# Patient Record
Sex: MALE | Race: WHITE | NOT HISPANIC OR LATINO | URBAN - METROPOLITAN AREA
[De-identification: names, ages, dates, MRNs, and addresses within clinical notes are randomized per-mention and may not be internally consistent; named-entity substitution may affect disease eponyms.]

---

## 2018-04-12 ENCOUNTER — INPATIENT (INPATIENT)
Facility: HOSPITAL | Age: 68
LOS: 2 days | Discharge: HOME | End: 2018-04-15
Attending: FAMILY MEDICINE | Admitting: FAMILY MEDICINE
Payer: MEDICARE

## 2018-04-12 VITALS
DIASTOLIC BLOOD PRESSURE: 90 MMHG | SYSTOLIC BLOOD PRESSURE: 151 MMHG | RESPIRATION RATE: 16 BRPM | HEART RATE: 95 BPM | TEMPERATURE: 99 F | OXYGEN SATURATION: 94 %

## 2018-04-12 LAB
ALBUMIN SERPL ELPH-MCNC: 4 G/DL — SIGNIFICANT CHANGE UP (ref 3.3–5)
ALP SERPL-CCNC: 90 U/L — SIGNIFICANT CHANGE UP (ref 40–120)
ALT FLD-CCNC: 22 U/L — SIGNIFICANT CHANGE UP (ref 12–78)
ANION GAP SERPL CALC-SCNC: 11 MMOL/L — SIGNIFICANT CHANGE UP (ref 5–17)
ANION GAP SERPL CALC-SCNC: 19 MMOL/L — HIGH (ref 5–17)
APTT BLD: 26.6 SEC — LOW (ref 27.5–37.4)
AST SERPL-CCNC: 24 U/L — SIGNIFICANT CHANGE UP (ref 15–37)
BASOPHILS # BLD AUTO: 0.06 K/UL — SIGNIFICANT CHANGE UP (ref 0–0.2)
BASOPHILS NFR BLD AUTO: 0.5 % — SIGNIFICANT CHANGE UP (ref 0–2)
BILIRUB SERPL-MCNC: 0.5 MG/DL — SIGNIFICANT CHANGE UP (ref 0.2–1.2)
BUN SERPL-MCNC: 11 MG/DL — SIGNIFICANT CHANGE UP (ref 7–23)
BUN SERPL-MCNC: 8 MG/DL — SIGNIFICANT CHANGE UP (ref 7–23)
CALCIUM SERPL-MCNC: 9.3 MG/DL — SIGNIFICANT CHANGE UP (ref 8.5–10.1)
CALCIUM SERPL-MCNC: 9.5 MG/DL — SIGNIFICANT CHANGE UP (ref 8.5–10.1)
CHLORIDE SERPL-SCNC: 92 MMOL/L — LOW (ref 96–108)
CHLORIDE SERPL-SCNC: 95 MMOL/L — LOW (ref 96–108)
CO2 SERPL-SCNC: 18 MMOL/L — LOW (ref 22–31)
CO2 SERPL-SCNC: 25 MMOL/L — SIGNIFICANT CHANGE UP (ref 22–31)
CREAT SERPL-MCNC: 0.78 MG/DL — SIGNIFICANT CHANGE UP (ref 0.5–1.3)
CREAT SERPL-MCNC: 1.02 MG/DL — SIGNIFICANT CHANGE UP (ref 0.5–1.3)
EOSINOPHIL # BLD AUTO: 0.09 K/UL — SIGNIFICANT CHANGE UP (ref 0–0.5)
EOSINOPHIL NFR BLD AUTO: 0.8 % — SIGNIFICANT CHANGE UP (ref 0–6)
ETHANOL SERPL-MCNC: <10 MG/DL — SIGNIFICANT CHANGE UP (ref 0–10)
GLUCOSE SERPL-MCNC: 101 MG/DL — HIGH (ref 70–99)
GLUCOSE SERPL-MCNC: 127 MG/DL — HIGH (ref 70–99)
HCT VFR BLD CALC: 40.8 % — SIGNIFICANT CHANGE UP (ref 39–50)
HGB BLD-MCNC: 14.5 G/DL — SIGNIFICANT CHANGE UP (ref 13–17)
IMM GRANULOCYTES NFR BLD AUTO: 0.4 % — SIGNIFICANT CHANGE UP (ref 0–1.5)
INR BLD: 1.31 RATIO — HIGH (ref 0.88–1.16)
LYMPHOCYTES # BLD AUTO: 28.8 % — SIGNIFICANT CHANGE UP (ref 13–44)
LYMPHOCYTES # BLD AUTO: 3.23 K/UL — SIGNIFICANT CHANGE UP (ref 1–3.3)
MAGNESIUM SERPL-MCNC: 1.6 MG/DL — SIGNIFICANT CHANGE UP (ref 1.6–2.6)
MCHC RBC-ENTMCNC: 30.3 PG — SIGNIFICANT CHANGE UP (ref 27–34)
MCHC RBC-ENTMCNC: 35.5 GM/DL — SIGNIFICANT CHANGE UP (ref 32–36)
MCV RBC AUTO: 85.4 FL — SIGNIFICANT CHANGE UP (ref 80–100)
MONOCYTES # BLD AUTO: 0.74 K/UL — SIGNIFICANT CHANGE UP (ref 0–0.9)
MONOCYTES NFR BLD AUTO: 6.6 % — SIGNIFICANT CHANGE UP (ref 2–14)
NEUTROPHILS # BLD AUTO: 7.03 K/UL — SIGNIFICANT CHANGE UP (ref 1.8–7.4)
NEUTROPHILS NFR BLD AUTO: 62.9 % — SIGNIFICANT CHANGE UP (ref 43–77)
NRBC # BLD: 0 /100 WBCS — SIGNIFICANT CHANGE UP (ref 0–0)
PLATELET # BLD AUTO: 245 K/UL — SIGNIFICANT CHANGE UP (ref 150–400)
POTASSIUM SERPL-MCNC: 3.3 MMOL/L — LOW (ref 3.5–5.3)
POTASSIUM SERPL-MCNC: 3.4 MMOL/L — LOW (ref 3.5–5.3)
POTASSIUM SERPL-SCNC: 3.3 MMOL/L — LOW (ref 3.5–5.3)
POTASSIUM SERPL-SCNC: 3.4 MMOL/L — LOW (ref 3.5–5.3)
PROT SERPL-MCNC: 7.9 GM/DL — SIGNIFICANT CHANGE UP (ref 6–8.3)
PROTHROM AB SERPL-ACNC: 14.2 SEC — HIGH (ref 9.8–12.7)
RBC # BLD: 4.78 M/UL — SIGNIFICANT CHANGE UP (ref 4.2–5.8)
RBC # FLD: 13.6 % — SIGNIFICANT CHANGE UP (ref 10.3–14.5)
SODIUM SERPL-SCNC: 129 MMOL/L — LOW (ref 135–145)
SODIUM SERPL-SCNC: 131 MMOL/L — LOW (ref 135–145)
TROPONIN I SERPL-MCNC: <0.015 NG/ML — SIGNIFICANT CHANGE UP (ref 0.01–0.04)
WBC # BLD: 11.2 K/UL — HIGH (ref 3.8–10.5)
WBC # FLD AUTO: 11.2 K/UL — HIGH (ref 3.8–10.5)

## 2018-04-12 PROCEDURE — 70450 CT HEAD/BRAIN W/O DYE: CPT | Mod: 26,59

## 2018-04-12 PROCEDURE — 70496 CT ANGIOGRAPHY HEAD: CPT | Mod: 26

## 2018-04-12 PROCEDURE — 99285 EMERGENCY DEPT VISIT HI MDM: CPT

## 2018-04-12 PROCEDURE — 93010 ELECTROCARDIOGRAM REPORT: CPT

## 2018-04-12 RX ORDER — LEVETIRACETAM 250 MG/1
1000 TABLET, FILM COATED ORAL EVERY 12 HOURS
Qty: 0 | Refills: 0 | Status: DISCONTINUED | OUTPATIENT
Start: 2018-04-13 | End: 2018-04-14

## 2018-04-12 RX ORDER — ONDANSETRON 8 MG/1
4 TABLET, FILM COATED ORAL ONCE
Qty: 0 | Refills: 0 | Status: COMPLETED | OUTPATIENT
Start: 2018-04-12 | End: 2018-04-12

## 2018-04-12 RX ORDER — AMLODIPINE BESYLATE 2.5 MG/1
10 TABLET ORAL DAILY
Qty: 0 | Refills: 0 | Status: DISCONTINUED | OUTPATIENT
Start: 2018-04-12 | End: 2018-04-12

## 2018-04-12 RX ORDER — HYDRALAZINE HCL 50 MG
50 TABLET ORAL
Qty: 0 | Refills: 0 | Status: DISCONTINUED | OUTPATIENT
Start: 2018-04-12 | End: 2018-04-12

## 2018-04-12 RX ORDER — METOPROLOL TARTRATE 50 MG
100 TABLET ORAL DAILY
Qty: 0 | Refills: 0 | Status: DISCONTINUED | OUTPATIENT
Start: 2018-04-12 | End: 2018-04-15

## 2018-04-12 RX ORDER — LEVETIRACETAM 250 MG/1
1000 TABLET, FILM COATED ORAL ONCE
Qty: 0 | Refills: 0 | Status: COMPLETED | OUTPATIENT
Start: 2018-04-12 | End: 2018-04-12

## 2018-04-12 RX ORDER — APIXABAN 2.5 MG/1
5 TABLET, FILM COATED ORAL EVERY 12 HOURS
Qty: 0 | Refills: 0 | Status: DISCONTINUED | OUTPATIENT
Start: 2018-04-12 | End: 2018-04-15

## 2018-04-12 RX ORDER — ACETAMINOPHEN 500 MG
650 TABLET ORAL EVERY 6 HOURS
Qty: 0 | Refills: 0 | Status: DISCONTINUED | OUTPATIENT
Start: 2018-04-12 | End: 2018-04-15

## 2018-04-12 RX ORDER — SODIUM CHLORIDE 9 MG/ML
1000 INJECTION INTRAMUSCULAR; INTRAVENOUS; SUBCUTANEOUS
Qty: 0 | Refills: 0 | Status: DISCONTINUED | OUTPATIENT
Start: 2018-04-12 | End: 2018-04-15

## 2018-04-12 RX ORDER — ONDANSETRON 8 MG/1
4 TABLET, FILM COATED ORAL EVERY 6 HOURS
Qty: 0 | Refills: 0 | Status: DISCONTINUED | OUTPATIENT
Start: 2018-04-12 | End: 2018-04-15

## 2018-04-12 RX ADMIN — LEVETIRACETAM 400 MILLIGRAM(S): 250 TABLET, FILM COATED ORAL at 17:25

## 2018-04-12 RX ADMIN — SODIUM CHLORIDE 100 MILLILITER(S): 9 INJECTION INTRAMUSCULAR; INTRAVENOUS; SUBCUTANEOUS at 22:46

## 2018-04-12 RX ADMIN — Medication 2 MILLIGRAM(S): at 17:35

## 2018-04-12 RX ADMIN — ONDANSETRON 4 MILLIGRAM(S): 8 TABLET, FILM COATED ORAL at 17:26

## 2018-04-12 RX ADMIN — Medication 2 MILLIGRAM(S): at 17:21

## 2018-04-12 NOTE — ED PROVIDER NOTE - OBJECTIVE STATEMENT
66 y/o male with no pertinent past medical history, [facility] local [facility], pt is a "regular" at the [facility] and the workers noticed the patient was acting strangely in his car so they called EMS. EMS states the patient was normal at the [facility] and had an acute onset of confusion at 4:30pm. 68 y/o male with no known past medical history, [facility] local [facility], pt is a "regular" at the [facility] and the workers noticed the patient was acting strangely in his car so they called EMS. EMS states the patient was normal at the [facility] and had an acute onset of confusion at 4:30pm. 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 EMS states the patient was normal at the [facility] and had an acute onset of confusion at 4:30pm.

## 2018-04-12 NOTE — ED ADULT NURSE REASSESSMENT NOTE - NS ED NURSE REASSESS COMMENT FT1
1721 - Pt going to CT scan.   1721 - IV access established RFA (ELIZABETH - RN)   While in the tube for CT scan pt began having a tonic clonic seizure that lasted approximately 1 minute long, pt bit tip of his tongue. pt became inc of urine, pt becoming post elicital and unable to follow commands, safety maintained, non rebreather applied and pt satting 100%, airway protected. Pt sustained abrasion on L wrist.   1721 - 2mg Ativan administered (RAZ - RN)  1725 - Infusion of 100mg Keppra IVPB (RAZ-RN)  1726 - IV access established in LFA (DARCI - RN)  1726 - 4mg of zofran administered (RAZ - RN)    1735 - 2mg ativan administered (RAZ - RN) 1721 - Pt going to CT scan.   1721 - IV access established RFA (ELIZABETH - RN)   While in the tube for CT scan pt began having a tonic clonic seizure that lasted approximately 1 minute long, pt bit tip of his tongue. pt became inc of urine, pt becoming post elicital and unable to follow commands, safety maintained, non rebreather applied and pt satting 100%, airway protected. Pt sustained abrasion on L wrist.  1721 - 2mg Ativan administered (BH - RN)  1725 - Infusion of 100mg Keppra IVPB (BH-RN)  1726 - IV access established in LFA (BE - RN)  1726 - 4mg of zofran administered (BH - RN)    1735 - 2mg ativan administered (BH - RN)  Due to post elictal events pt is unable to tolerate the CTA at this time.

## 2018-04-12 NOTE — ED PROVIDER NOTE - MUSCULOSKELETAL, MLM
Spine appears normal, range of motion is not limited, no muscle or joint tenderness. Moving all four extremities. No facial asymmetry.

## 2018-04-12 NOTE — ED PROVIDER NOTE - PROGRESS NOTE DETAILS
Leandro AI- Pt had seizure episode in CT scan to ativan given, will assess for CVA with CT angio. Leandro MCCORD- Discussed case with transfer center who states the patient is not eligible for transfer. Leandro MCCORD- Spoke to patient's wife at bedside who states patient has a history of seizure after an intercerebral stroke, not on any seizure medications. Leandro MCCORD- Spoke to Neurologist Dr. Keating who will evaluate the patient as an inpatient, recommends MR brain.

## 2018-04-12 NOTE — ED PROVIDER NOTE - CONSTITUTIONAL, MLM
normal... Well appearing, well nourished, awake, alert, oriented to person, place, time/situation and in no apparent distress. Well nourished, awake, alert, oriented to person, place, time/situation.

## 2018-04-12 NOTE — H&P ADULT - NSHPPHYSICALEXAM_GEN_ALL_CORE
Vital Signs Last 24 Hrs  T(C): 37.1 (12 Apr 2018 17:18), Max: 37.1 (12 Apr 2018 17:18)  T(F): 98.7 (12 Apr 2018 17:18), Max: 98.7 (12 Apr 2018 17:18)  HR: 102 (12 Apr 2018 19:00) (95 - 141)  BP: 110/61 (12 Apr 2018 19:00) (97/65 - 151/90)  BP(mean): --  RR: 14 (12 Apr 2018 19:00) (14 - 23)  SpO2: 97% (12 Apr 2018 19:00) (94% - 100%)

## 2018-04-12 NOTE — ED ADULT NURSE NOTE - OBJECTIVE STATEMENT
Pt found in parking lot acting abnormally, biba uknown hx, code stroke initiated, pt brought to ct scan. No active distress noted. Pt nodding head to all questions, not speaking.

## 2018-04-12 NOTE — ED ADULT TRIAGE NOTE - CHIEF COMPLAINT QUOTE
Pt. to the ED [facility] from [facility] C/O AMS since 4: 30 PM- As per EMS, Pt is a regular client and today he got confused after eating Pizza. Code Stroke called to the ED by EMS RN - Dr. Keating to the bedside

## 2018-04-12 NOTE — H&P ADULT - HISTORY OF PRESENT ILLNESS
66 yo male with PMH of a. 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 Pt is a poor historian and does not recall events of the day. As per EMS documentation [facility] and was noted to be confused and not acting like himself. The workers called 911 when the patient was in his car as he was with AMS. He was last known to be his normal self at 430PM. In the ED pt was a CODE STROKE and was going for a CT of the head when he had a tonic clonic seizure which lasted about 1 minute as witnessed by RNs and staff in the CT machine. Pt was incontinent of urine and also bit his tongue. He has an abrasion on his left wrist. Upon arrival to ED and currently pt remains with global aphasia. He is still confused and A+Ox1 (only to self). He denies any fevers, chills, headaches, changes in vision, weakness, chest pain, palpitations, abd pain, N/V, dizziness. He was not noted to have a facial droop or slurred speech. As per wife pt did have a seizure x1 in 2015 post hemorrhagic CVA and no further episodes.     Pt not a candidate for TPA as he is on eliquis and he was declined by transfer center as per ED.

## 2018-04-12 NOTE — H&P ADULT - ASSESSMENT
66 yo male with PMH of a. 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 Pt is a poor historian and does not recall events of the day. As per EMS [facility] and was noted to be confused and not acting like himself. The workers called 911 when the patient was in his car as he was with AMS. He was last known to be his normal self at 430PM. In the ED pt was a CODE STROKE and was going for a CT of the head when he had a tonic clonic seizure which lasted about 1 minute as witnessed by RNs and staff in the CT machine. Pt was incontinent of urine and also bit his tongue. He has an abrasion on his left wrist. Upon arrival to ED and currently pt remains with global aphasia. He is still confused and A+Ox1 (only to self). He denies any fevers, chills, headaches, changes in vision, weakness, chest pain, palpitations, abd pain, N/V, dizziness. He was not noted to have a facial droop or slurred speech. As per wife pt did have a seizure x1 in 2015 post hemorrhagic CVA and no further episodes.     #AMS with global aphasia - r/o CVA  #tonic clonic seizure  - admit to tele  - CT head and CTA negative for ACUTE infarct, old infarcts noted, no hemodynamically significant stenosis  - MRI head  - EEG  - keppra IV BID  - NPO  - neuro checks  - neuro consult  - echo  - seizure precautions  - speech and swallow eval    #Anion gap metabolic acidosis  - chronic as per wife at bedside, has previously been on sodium bicarb tabs at home  - blood drawn after seizure episode  - repeat BMP now  - IV fluids  - hold HCTZ    #Hyponatremia  - hold HCTZ  - IV fluids  - monitor Na levels    #A. fib with mild RVR  - continue metoprolol  - continue eliquis    #HTN  - continue metoprolol  - hold hydralazine and norvasc tonight given possibility of acute CVA    #DVT prophylaxis  - continue eliquis

## 2018-04-12 NOTE — ED ADULT NURSE REASSESSMENT NOTE - NS ED NURSE REASSESS COMMENT FT1
Pt returned to trauma room and monitored, vitals stable see flow sheet   1830 - Pt now redirectable, able to tolerate the CTA

## 2018-04-12 NOTE — PATIENT PROFILE ADULT. - TEACHING/LEARNING LEARNING PREFERENCES
verbal instruction/video/written material/audio/individual instruction/pictorial/skill demonstration/computer/internet/group instruction

## 2018-04-13 LAB
ALBUMIN SERPL ELPH-MCNC: 3.3 G/DL — SIGNIFICANT CHANGE UP (ref 3.3–5)
ALP SERPL-CCNC: 80 U/L — SIGNIFICANT CHANGE UP (ref 40–120)
ALT FLD-CCNC: 17 U/L — SIGNIFICANT CHANGE UP (ref 12–78)
AMPHET UR-MCNC: NEGATIVE — SIGNIFICANT CHANGE UP
ANION GAP SERPL CALC-SCNC: 11 MMOL/L — SIGNIFICANT CHANGE UP (ref 5–17)
AST SERPL-CCNC: 19 U/L — SIGNIFICANT CHANGE UP (ref 15–37)
BARBITURATES UR SCN-MCNC: NEGATIVE — SIGNIFICANT CHANGE UP
BASOPHILS # BLD AUTO: 0.03 K/UL — SIGNIFICANT CHANGE UP (ref 0–0.2)
BASOPHILS NFR BLD AUTO: 0.4 % — SIGNIFICANT CHANGE UP (ref 0–2)
BENZODIAZ UR-MCNC: NEGATIVE — SIGNIFICANT CHANGE UP
BILIRUB SERPL-MCNC: 0.5 MG/DL — SIGNIFICANT CHANGE UP (ref 0.2–1.2)
BUN SERPL-MCNC: 6 MG/DL — LOW (ref 7–23)
CALCIUM SERPL-MCNC: 8.9 MG/DL — SIGNIFICANT CHANGE UP (ref 8.5–10.1)
CHLORIDE SERPL-SCNC: 99 MMOL/L — SIGNIFICANT CHANGE UP (ref 96–108)
CHOLEST SERPL-MCNC: 158 MG/DL — SIGNIFICANT CHANGE UP (ref 10–199)
CO2 SERPL-SCNC: 24 MMOL/L — SIGNIFICANT CHANGE UP (ref 22–31)
COCAINE METAB.OTHER UR-MCNC: NEGATIVE — SIGNIFICANT CHANGE UP
CREAT SERPL-MCNC: 0.74 MG/DL — SIGNIFICANT CHANGE UP (ref 0.5–1.3)
EOSINOPHIL # BLD AUTO: 0.05 K/UL — SIGNIFICANT CHANGE UP (ref 0–0.5)
EOSINOPHIL NFR BLD AUTO: 0.7 % — SIGNIFICANT CHANGE UP (ref 0–6)
GLUCOSE SERPL-MCNC: 94 MG/DL — SIGNIFICANT CHANGE UP (ref 70–99)
HBA1C BLD-MCNC: 5.4 % — SIGNIFICANT CHANGE UP (ref 4–5.6)
HCT VFR BLD CALC: 38.5 % — LOW (ref 39–50)
HDLC SERPL-MCNC: 51 MG/DL — SIGNIFICANT CHANGE UP (ref 40–125)
HGB BLD-MCNC: 13.8 G/DL — SIGNIFICANT CHANGE UP (ref 13–17)
IMM GRANULOCYTES NFR BLD AUTO: 0.3 % — SIGNIFICANT CHANGE UP (ref 0–1.5)
LIPID PNL WITH DIRECT LDL SERPL: 91 MG/DL — SIGNIFICANT CHANGE UP
LYMPHOCYTES # BLD AUTO: 2.06 K/UL — SIGNIFICANT CHANGE UP (ref 1–3.3)
LYMPHOCYTES # BLD AUTO: 29.2 % — SIGNIFICANT CHANGE UP (ref 13–44)
MAGNESIUM SERPL-MCNC: 1.7 MG/DL — SIGNIFICANT CHANGE UP (ref 1.6–2.6)
MCHC RBC-ENTMCNC: 30.2 PG — SIGNIFICANT CHANGE UP (ref 27–34)
MCHC RBC-ENTMCNC: 35.8 GM/DL — SIGNIFICANT CHANGE UP (ref 32–36)
MCV RBC AUTO: 84.2 FL — SIGNIFICANT CHANGE UP (ref 80–100)
METHADONE UR-MCNC: NEGATIVE — SIGNIFICANT CHANGE UP
MONOCYTES # BLD AUTO: 0.76 K/UL — SIGNIFICANT CHANGE UP (ref 0–0.9)
MONOCYTES NFR BLD AUTO: 10.8 % — SIGNIFICANT CHANGE UP (ref 2–14)
NEUTROPHILS # BLD AUTO: 4.14 K/UL — SIGNIFICANT CHANGE UP (ref 1.8–7.4)
NEUTROPHILS NFR BLD AUTO: 58.6 % — SIGNIFICANT CHANGE UP (ref 43–77)
NRBC # BLD: 0 /100 WBCS — SIGNIFICANT CHANGE UP (ref 0–0)
OPIATES UR-MCNC: NEGATIVE — SIGNIFICANT CHANGE UP
PCP SPEC-MCNC: SIGNIFICANT CHANGE UP
PCP UR-MCNC: NEGATIVE — SIGNIFICANT CHANGE UP
PHOSPHATE SERPL-MCNC: 2.4 MG/DL — LOW (ref 2.5–4.5)
PLATELET # BLD AUTO: 200 K/UL — SIGNIFICANT CHANGE UP (ref 150–400)
POTASSIUM SERPL-MCNC: 3.2 MMOL/L — LOW (ref 3.5–5.3)
POTASSIUM SERPL-SCNC: 3.2 MMOL/L — LOW (ref 3.5–5.3)
PROT SERPL-MCNC: 6.8 GM/DL — SIGNIFICANT CHANGE UP (ref 6–8.3)
RBC # BLD: 4.57 M/UL — SIGNIFICANT CHANGE UP (ref 4.2–5.8)
RBC # FLD: 13.7 % — SIGNIFICANT CHANGE UP (ref 10.3–14.5)
SODIUM SERPL-SCNC: 134 MMOL/L — LOW (ref 135–145)
THC UR QL: NEGATIVE — SIGNIFICANT CHANGE UP
TOTAL CHOLESTEROL/HDL RATIO MEASUREMENT: 3.1 RATIO — LOW (ref 3.4–9.6)
TRIGL SERPL-MCNC: 82 MG/DL — SIGNIFICANT CHANGE UP (ref 10–149)
TSH SERPL-MCNC: 1.8 UIU/ML — SIGNIFICANT CHANGE UP (ref 0.36–3.74)
WBC # BLD: 7.06 K/UL — SIGNIFICANT CHANGE UP (ref 3.8–10.5)
WBC # FLD AUTO: 7.06 K/UL — SIGNIFICANT CHANGE UP (ref 3.8–10.5)

## 2018-04-13 PROCEDURE — 99223 1ST HOSP IP/OBS HIGH 75: CPT

## 2018-04-13 PROCEDURE — 93306 TTE W/DOPPLER COMPLETE: CPT | Mod: 26

## 2018-04-13 PROCEDURE — 95816 EEG AWAKE AND DROWSY: CPT | Mod: 26

## 2018-04-13 PROCEDURE — 70551 MRI BRAIN STEM W/O DYE: CPT | Mod: 26

## 2018-04-13 RX ORDER — POTASSIUM CHLORIDE 20 MEQ
40 PACKET (EA) ORAL
Qty: 0 | Refills: 0 | Status: COMPLETED | OUTPATIENT
Start: 2018-04-13 | End: 2018-04-13

## 2018-04-13 RX ORDER — POTASSIUM CHLORIDE 20 MEQ
40 PACKET (EA) ORAL EVERY 4 HOURS
Qty: 0 | Refills: 0 | Status: DISCONTINUED | OUTPATIENT
Start: 2018-04-13 | End: 2018-04-13

## 2018-04-13 RX ADMIN — APIXABAN 5 MILLIGRAM(S): 2.5 TABLET, FILM COATED ORAL at 05:57

## 2018-04-13 RX ADMIN — Medication 40 MILLIEQUIVALENT(S): at 13:14

## 2018-04-13 RX ADMIN — SODIUM CHLORIDE 100 MILLILITER(S): 9 INJECTION INTRAMUSCULAR; INTRAVENOUS; SUBCUTANEOUS at 15:40

## 2018-04-13 RX ADMIN — Medication 100 MILLIGRAM(S): at 05:57

## 2018-04-13 RX ADMIN — Medication 1 TABLET(S): at 13:14

## 2018-04-13 RX ADMIN — APIXABAN 5 MILLIGRAM(S): 2.5 TABLET, FILM COATED ORAL at 17:05

## 2018-04-13 RX ADMIN — LEVETIRACETAM 400 MILLIGRAM(S): 250 TABLET, FILM COATED ORAL at 17:05

## 2018-04-13 RX ADMIN — Medication 40 MILLIEQUIVALENT(S): at 10:59

## 2018-04-13 RX ADMIN — LEVETIRACETAM 400 MILLIGRAM(S): 250 TABLET, FILM COATED ORAL at 05:57

## 2018-04-13 RX ADMIN — SODIUM CHLORIDE 100 MILLILITER(S): 9 INJECTION INTRAMUSCULAR; INTRAVENOUS; SUBCUTANEOUS at 06:08

## 2018-04-13 NOTE — SWALLOW BEDSIDE ASSESSMENT ADULT - SLP PERTINENT HISTORY OF CURRENT PROBLEM
brought by police to ER after experiencing period of confusion and speech diffiuclty at [facility] where he wanted to  a pizza.

## 2018-04-13 NOTE — SPEECH LANGUAGE PATHOLOGY EVALUATION - COMMENTS
REPETITION:  Able to repeat words and sentences of increasing word and syllable length, grammatic and syntactic, phonetic complexity without error of auditory working memory: no evidence of agrammatism; apraxia of motor speech or of dysarthria.   LEXICAL RETRIEVAL: Only mild sporadic longer latencies for lexical retrieval on the pictured items of less frequency, which is within normal age-appropriate limits. Motor speech is within normal limits: no evidence of dysarthria or of motor planning deficits for speech.    Speech-language function is within normal limits.  pt is at baseline and Service will not follow.  Please re-consult prn. See Dysphagia evaluation Able to follow 1,2,3 step ditrections:  Comprehension of Complex Ideational Material is within normal limits:

## 2018-04-13 NOTE — PHYSICAL THERAPY INITIAL EVALUATION ADULT - MODALITIES TREATMENT COMMENTS
pt left in bed supine post Eval; bed alarm on; HM in place; callbell in reach; pt instructed not to get up alone; call nursing for assist; krish well; denied pain

## 2018-04-13 NOTE — SWALLOW BEDSIDE ASSESSMENT ADULT - SLP GENERAL OBSERVATIONS
On encounter, pt seated upright in bed; alert and verbally conversant: interactive eye gaze.  Able to explain and recount events leading hospitalization with grammar and syntax, and no overt deficits in lexical retrieval.   :

## 2018-04-13 NOTE — SPEECH LANGUAGE PATHOLOGY EVALUATION - SLP BEHAVIORAL OBSERVATIONS
within functional limits/Seated upright in bed, about to eat breakfast: alert and opriented to proceedings: able to explain and recount the events leading to his hospitalization. Prragmatics:  able to maintain an approriate conversation with topic maintenance and change and termination. No overt deficts in grammar or syntax; or lexical retrieval.  Appropriate eye gaze.

## 2018-04-13 NOTE — SWALLOW BEDSIDE ASSESSMENT ADULT - NS SPL SWALLOW CLINIC TRIAL FT
Rx: Maintain regular consistency diet with regular liquids:   meds as tolerated.  pt is at baseline and service will not follow

## 2018-04-13 NOTE — CONSULT NOTE ADULT - SUBJECTIVE AND OBJECTIVE BOX
Patient is a 67y old  Male who presents with a chief complaint of AMS, seizure      HPI:  Patient is 67-year-old with history of hypertension, chronic atrial fibrillation with history of CVA in June 2016 with almost complete recovery, high cholesterol, history of alcohol abuse in the past but he stopped consuming alcohol 2017, he was admitted after he had an episode of altered mental status. Patient states he went to a [facility] parlor and he became confused. In the emergency room patient was noted to be confused and he had an episode of seizure. Which lasted for 1 minute. Patient had become incontinent to urine and also had tongue bite. Patient now is alert and oriented. She denies any chest pain, shortness of breath, palpitations or headache. He is in atrial fibrillation and heart rate is controlled. He denies any headache or any blurred vision.     CT Angio Head w/ IV Cont (04.12.18     FINDINGS: Comparison is made to CT head of same day.    The carotid circulation is intact without hemodynamically significant   stenosis.   The vertebral arteries are patent.         Intracranial vertebral arteries are intact without evidence of dissection   or hemodynamically significant stenosis. The basilar artery and posterior   cerebral arteries and are normal without hemodynamically significant   stenosis. Bilateral superior cerebellar arteries are intact. The   intracranial internal carotid arteries, middle cerebral arteries, and   anterior cerebral arteries are intact without hemodynamically significant   stenosis.  There is no evidence of aneurysm or vascular malformation.    The brain demonstrates no acute cerebral cortical infarct is seen.  No  evidence of midline shift.  The ventricles, sulci and basal cisterns   appear unremarkable.      The paranasal sinuses are clear.        IMPRESSION:          1.   Right carotid system:  No hemodynamically significant stenosis.          2.   Left carotid system:  No hemodynamically significant stenosis.           3.   Intracranial circulation:  No hemodynamically significant   stenosis.        PAST MEDICAL & SURGICAL HISTORY:  CVA (cerebral vascular accident)  Atrial fibrillation  Hypertension      PREVIOUS DIAGNOSTIC TESTING:      ECHO  FINDINGS: April 2018 normal LVEF  60-65%.      MEDICATIONS  (STANDING):  apixaban 5 milliGRAM(s) Oral every 12 hours  levETIRAcetam  IVPB 1000 milliGRAM(s) IV Intermittent every 12 hours  metoprolol succinate  milliGRAM(s) Oral daily  multivitamin 1 Tablet(s) Oral daily  potassium chloride    Tablet ER 40 milliEquivalent(s) Oral every 2 hours  sodium chloride 0.9%. 1000 milliLiter(s) (100 mL/Hr) IV Continuous <Continuous>    MEDICATIONS  (PRN):  acetaminophen   Tablet. 650 milliGRAM(s) Oral every 6 hours PRN Mild Pain (1 - 3)  LORazepam   Injectable 2 milliGRAM(s) IV Push every 8 hours PRN Seizures  ondansetron Injectable 4 milliGRAM(s) IV Push every 6 hours PRN Nausea      FAMILY HISTORY: both periods disease in the 70s with complication of coronary artery disease.      SOCIAL HISTORY:  he denies any  recent history of smoking or any alcohol consumption.    REVIEW OF SYSTEM:  Pertinent items are noted in HPI.  Constitutional	Negative for chills, fevers, sweats.    Eyes: 	Negative for visual disturbance.  Ears, nose, mouth, throat, and face: Negative for epistaxis, nasal congestion, sore throat and tinnitus.  Neck:	Negative for enlargement, pain and difficulty in swallowing  Respiration : Negative for cough, dyspnea on exertion, pleuritic chest pain and wheezing  Cardiovascular: Negative for chest pain, dyspnea and palpitations    Gastrointestinal : Negative for abdominal pain, diarrhea, nausea and vomiting  Genitourinary: Negative for dysuria, frequency and urinary incontinence .  Skin: Negative for  rash, pruritus, swelling, dryness .  	  Hematologic/lymphatic: Negative for bleeding and easy bruising  Musculoskeletal: Negative for arthralgias, back pain and muscle weakness.  Neurological: Negative for dizziness, headaches,  and tremors . He had an episode of confusion & seizure.  Behavioral/Psych: Negative for mood change, depression.  Endocrine:	Negative for blurry vision, polydipsia and polyuria, diaphoresis.   Allergic/Immunologic:	Negative for anaphylaxis, angioedema and urticaria.      Vital Signs Last 24 Hrs  T(C): 36.8 (13 Apr 2018 04:49), Max: 37.1 (12 Apr 2018 17:18)  T(F): 98.3 (13 Apr 2018 04:49), Max: 98.7 (12 Apr 2018 17:18)  HR: 86 (13 Apr 2018 04:49) (82 - 141)  BP: 119/76 (13 Apr 2018 04:49) (97/65 - 151/90)  BP(mean): --  RR: 16 (13 Apr 2018 04:49) (14 - 23)  SpO2: 96% (13 Apr 2018 04:49) (94% - 100%)    I&O's Summary    PHYSICAL EXAM  General Appearance: cooperative, no acute distress,   HEENT: PERRL, conjunctiva clear, EOM's intact, non injected pharynx, no exudate .  Neck: Supple, , no adenopathy, thyroid: not enlarged, no carotid bruit or JVD  Back: Symmetric, no  tenderness,no soft tissue tenderness  Lungs: Clear to auscultation bilateral,no adventitious breath sounds, normal   expiratory phase  Heart: Irregular rate and rhythm, S1, S2 normal, no murmur, rub or gallop  Abdomen: Soft, non-tender, bowel sounds active , no hepatosplenomegaly  Extremities: no cyanosis or edema, no joint swelling  Skin: Skin color, texture normal, no rashes   Neurologic: Alert and oriented X3 , cranial nerves intact, sensory and motor normal,        INTERPRETATION OF TELEMETRY: A fib heart rate is controlled    ECG: A fib         LABS:                          13.8   7.06  )-----------( 200      ( 13 Apr 2018 05:28 )             38.5     04-13    134<L>  |  99  |  6<L>  ----------------------------<  94  3.2<L>   |  24  |  0.74    Ca    8.9      13 Apr 2018 05:28  Phos  2.4     04-13  Mg     1.7     04-13    TPro  6.8  /  Alb  3.3  /  TBili  0.5  /  DBili  x   /  AST  19  /  ALT  17  /  AlkPhos  80  04-13    CARDIAC MARKERS ( 12 Apr 2018 18:23 )  <0.015 ng/mL / x     / x     / x     / x          Lipid Panel  158  51  91  82      PT/INR - ( 12 Apr 2018 18:23 )   PT: 14.2 sec;   INR: 1.31 ratio         PTT - ( 12 Apr 2018 18:23 )  PTT:26.6 sec

## 2018-04-13 NOTE — SWALLOW BEDSIDE ASSESSMENT ADULT - COMMENTS
67 year old male with PMH of a. 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 Pt is a poor historian and does not recall events of the day. As per EMS [facility] and was noted to be confused and not acting like himself. The workers called 911 when the patient was in his car as he was with AMS. He was last known to be his normal self at 430PM. In the ED pt was a CODE STROKE and was going for a CT of the head when he had a tonic clonic seizure which lasted about 1 minute as witnessed by RNs and staff in the CT machine. Pt was incontinent of urine and also bit his tongue. He has an abrasion on his left wrist. Upon arrival to ED and currently pt remains with global aphasia. He is still confused and A+Ox1 (only to self). He denies any fevers, chills, headaches, changes in vision, weakness, chest pain, palpitations, abd pain, N/V, dizziness. He was not noted to have a facial droop or slurred speech. As per wife pt did have a seizure x1 in 2015 post hemorrhagic CVA and no further episodes.

## 2018-04-13 NOTE — CONSULT NOTE ADULT - ASSESSMENT
Altered mental status/seizure episode.  History of CVA in the past.  Chronic atrial fibrillation.  Hypertension.  Suggest  Observe on telemetry.  Continue with beta blockers and anticoagulation with Eliquis.  Neurological workup is in progress.  Gradual ambulation.  Discussed general condition patient and wife and hospitalist.

## 2018-04-14 LAB
ANION GAP SERPL CALC-SCNC: 10 MMOL/L — SIGNIFICANT CHANGE UP (ref 5–17)
BUN SERPL-MCNC: 5 MG/DL — LOW (ref 7–23)
CALCIUM SERPL-MCNC: 8.5 MG/DL — SIGNIFICANT CHANGE UP (ref 8.5–10.1)
CHLORIDE SERPL-SCNC: 104 MMOL/L — SIGNIFICANT CHANGE UP (ref 96–108)
CO2 SERPL-SCNC: 24 MMOL/L — SIGNIFICANT CHANGE UP (ref 22–31)
CREAT SERPL-MCNC: 0.59 MG/DL — SIGNIFICANT CHANGE UP (ref 0.5–1.3)
GLUCOSE SERPL-MCNC: 81 MG/DL — SIGNIFICANT CHANGE UP (ref 70–99)
HCT VFR BLD CALC: 38.5 % — LOW (ref 39–50)
HGB BLD-MCNC: 13.2 G/DL — SIGNIFICANT CHANGE UP (ref 13–17)
MCHC RBC-ENTMCNC: 29.9 PG — SIGNIFICANT CHANGE UP (ref 27–34)
MCHC RBC-ENTMCNC: 34.3 GM/DL — SIGNIFICANT CHANGE UP (ref 32–36)
MCV RBC AUTO: 87.3 FL — SIGNIFICANT CHANGE UP (ref 80–100)
NRBC # BLD: 0 /100 WBCS — SIGNIFICANT CHANGE UP (ref 0–0)
PLATELET # BLD AUTO: 165 K/UL — SIGNIFICANT CHANGE UP (ref 150–400)
POTASSIUM SERPL-MCNC: 3.7 MMOL/L — SIGNIFICANT CHANGE UP (ref 3.5–5.3)
POTASSIUM SERPL-SCNC: 3.7 MMOL/L — SIGNIFICANT CHANGE UP (ref 3.5–5.3)
RBC # BLD: 4.41 M/UL — SIGNIFICANT CHANGE UP (ref 4.2–5.8)
RBC # FLD: 13.8 % — SIGNIFICANT CHANGE UP (ref 10.3–14.5)
SODIUM SERPL-SCNC: 138 MMOL/L — SIGNIFICANT CHANGE UP (ref 135–145)
WBC # BLD: 5.58 K/UL — SIGNIFICANT CHANGE UP (ref 3.8–10.5)
WBC # FLD AUTO: 5.58 K/UL — SIGNIFICANT CHANGE UP (ref 3.8–10.5)

## 2018-04-14 PROCEDURE — 99223 1ST HOSP IP/OBS HIGH 75: CPT

## 2018-04-14 RX ORDER — AMLODIPINE BESYLATE 2.5 MG/1
10 TABLET ORAL ONCE
Qty: 0 | Refills: 0 | Status: COMPLETED | OUTPATIENT
Start: 2018-04-14 | End: 2018-04-14

## 2018-04-14 RX ORDER — LEVETIRACETAM 250 MG/1
1000 TABLET, FILM COATED ORAL
Qty: 0 | Refills: 0 | Status: DISCONTINUED | OUTPATIENT
Start: 2018-04-14 | End: 2018-04-15

## 2018-04-14 RX ADMIN — LEVETIRACETAM 1000 MILLIGRAM(S): 250 TABLET, FILM COATED ORAL at 17:29

## 2018-04-14 RX ADMIN — Medication 100 MILLIGRAM(S): at 05:28

## 2018-04-14 RX ADMIN — APIXABAN 5 MILLIGRAM(S): 2.5 TABLET, FILM COATED ORAL at 17:29

## 2018-04-14 RX ADMIN — AMLODIPINE BESYLATE 10 MILLIGRAM(S): 2.5 TABLET ORAL at 19:18

## 2018-04-14 RX ADMIN — Medication 1 TABLET(S): at 11:57

## 2018-04-14 RX ADMIN — LEVETIRACETAM 400 MILLIGRAM(S): 250 TABLET, FILM COATED ORAL at 05:28

## 2018-04-14 RX ADMIN — APIXABAN 5 MILLIGRAM(S): 2.5 TABLET, FILM COATED ORAL at 05:28

## 2018-04-14 NOTE — CONSULT NOTE ADULT - ASSESSMENT
Patient is 67-year-old with history of hypertension, chronic atrial fibrillation with history of CVA in June 2016 with almost complete recovery, high cholesterol, history of alcohol abuse in the past  he was admitted after he had an episode of altered mental status. Patient states he went to a [facility] parlor and he became confused.In the emergency room patient was noted to be confused and he had an episode of seizure, Which lasted for 1 minute.    New onset Seizures likely from Underlying old CVA  R/o Metabolic causes.  REc EEG monitoring for 24 hrs.  continue Keppra 1000 mg q 12 hrs.  Advise  not to drive.  Continue other meds.  Correct any underlying metabolic causes.  Will f/u.  Discussed with Pt, his wife  at bed side and .

## 2018-04-14 NOTE — CONSULT NOTE ADULT - SUBJECTIVE AND OBJECTIVE BOX
Patient is a 67y old  Male who presents with a chief complaint of AMS, seizures .       HPI:  68 yo male with PMH of a. 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 Pt is a poor historian and does not recall events of the day. As per EMS documentation [facility] and was noted to be confused and not acting like himself. The workers called 911 when the patient was in his car as he was with AMS. He was last known to be his normal self at 430PM. In the ED pt was a CODE STROKE and was going for a CT of the head when he had a tonic clonic seizure which lasted about 1 minute as witnessed by RNs and staff in the CT machine. Pt was incontinent of urine and also bit his tongue. He has an abrasion on his left wrist. Upon arrival to ED and currently pt remains with global aphasia. He is still confused and A+Ox1 (only to self). He denies any fevers, chills, headaches, changes in vision, weakness, chest pain, palpitations, abd pain, N/V, dizziness. He was not noted to have a facial droop or slurred speech. As per wife pt did have a seizure x1 in 2015 post hemorrhagic CVA and no further episodes. Today pt more awake, alert offers no c/o, wants to go home.     Pt not a candidate for TPA as he is on eliquis and he was declined by transfer center as per ED. Admitted and CT/ MRI done. Routine EEG were done.       PAST MEDICAL & SURGICAL HISTORY:  CVA (cerebral vascular accident)  Atrial fibrillation  Hypertension      FAMILY HISTORY:      Social Hx:  Nonsmoker, no drug or alcohol use    MEDICATIONS  (STANDING):  apixaban 5 milliGRAM(s) Oral every 12 hours  levETIRAcetam  IVPB 1000 milliGRAM(s) IV Intermittent every 12 hours  metoprolol succinate  milliGRAM(s) Oral daily  multivitamin 1 Tablet(s) Oral daily  sodium chloride 0.9%. 1000 milliLiter(s) (100 mL/Hr) IV Continuous <Continuous>       Allergies    No Known Allergies    ROS: Pertinent positives in HPI, all other ROS were reviewed and are negative.      Vital Signs Last 24 Hrs  T(C): 36.6 (14 Apr 2018 04:27), Max: 37.1 (13 Apr 2018 11:01)  T(F): 97.9 (14 Apr 2018 04:27), Max: 98.7 (13 Apr 2018 11:01)  HR: 90 (14 Apr 2018 04:27) (62 - 90)  BP: 133/85 (14 Apr 2018 04:27) (112/94 - 133/85)  BP(mean): --  RR: 18 (14 Apr 2018 04:27) (17 - 18)  SpO2: 97% (14 Apr 2018 04:27) (96% - 99%)        Constitutional: awake and alert.  HEENT: PERRLA, EOMI,   Neck: Supple.  Respiratory: Breath sounds are clear bilaterally  Cardiovascular: S1 and S2, irregular rhythm  Gastrointestinal: soft, nontender  Extremities:  no edema  Vascular:  Carotid Bruit - no  Musculoskeletal: no joint swelling/tenderness, no abnormal movements  Skin: No rashes    Neurological exam:  HF: A x O x 3. Appropriately interactive, normal affect. Speech fluent, No Aphasia or paraphasic errors. Naming /repetition intact   CN: JESSY, EOMI, VFF, facial sensation normal, no NLFD, tongue midline, Palate moves equally, SCM equal bilaterally  Motor: No pronator drift, Strength 5/5 in all 4 ext, normal bulk and tone, no tremor, rigidity or bradykinesia.    Sens: Intact to light touch / PP/ VS/ JS    Reflexes: Symmetric and normal . BJ 2+, BR 2+, KJ 1+, AJ 1+, downgoing toes b/l  Coord:  No FNFA, dysmetria, WALT intact   Gait/Balance: Cannot test    NIHSS: 0      Labs:   04-14    138  |  104  |  5<L>  ----------------------------<  81  3.7   |  24  |  0.59    Ca    8.5      14 Apr 2018 06:35  Phos  2.4     04-13  Mg     1.7     04-13    TPro  6.8  /  Alb  3.3  /  TBili  0.5  /  DBili  x   /  AST  19  /  ALT  17  /  AlkPhos  80  04-13    04-13 Chol 158 LDL 91 HDL 51 Trig 82  04-13 [id number] 5.4                          13.2   5.58  )-----------( 165      ( 14 Apr 2018 06:35 )             38.5       Radiology:  - CT Head with CTA brain 4/12/18  < from: CT Brain Stroke Protocol (04.12.18 @ 17:22) >  IMPRESSION:       No acute intracranial findings. Chronic appearing lacunar infarcts in the   bilateral external capsules and small old left posterior parietal lobe   infarct is again identified. Periventricular chronic microvascular   ischemic changes are present.     < from: CT Angio Head w/ IV Cont (04.12.18 @ 18:44) >    IMPRESSION:          1.   Right carotid system:  No hemodynamically significant stenosis.          2.   Left carotid system:  No hemodynamically significant stenosis.           3.   Intracranial circulation:  No hemodynamically significant   stenosis.        - MRI brain  4/13/18    IMPRESSION:  No acute infarct. Focal chronic infarct in the left   posterior temporal lobe and chronic lacunar infarcts in the bilateral   basal ganglia radiata are present.     - EEG 4/13/18  < from: EEG Awake or Drowsy (04.13.18 @ 09:00) >  Impression: Normal EEG performed with the patient awake and drowsy.

## 2018-04-15 VITALS
OXYGEN SATURATION: 98 % | DIASTOLIC BLOOD PRESSURE: 89 MMHG | RESPIRATION RATE: 20 BRPM | HEART RATE: 72 BPM | TEMPERATURE: 98 F | SYSTOLIC BLOOD PRESSURE: 125 MMHG

## 2018-04-15 LAB
ANION GAP SERPL CALC-SCNC: 10 MMOL/L — SIGNIFICANT CHANGE UP (ref 5–17)
BUN SERPL-MCNC: 8 MG/DL — SIGNIFICANT CHANGE UP (ref 7–23)
CALCIUM SERPL-MCNC: 8.7 MG/DL — SIGNIFICANT CHANGE UP (ref 8.5–10.1)
CHLORIDE SERPL-SCNC: 103 MMOL/L — SIGNIFICANT CHANGE UP (ref 96–108)
CO2 SERPL-SCNC: 23 MMOL/L — SIGNIFICANT CHANGE UP (ref 22–31)
CREAT SERPL-MCNC: 0.7 MG/DL — SIGNIFICANT CHANGE UP (ref 0.5–1.3)
GLUCOSE SERPL-MCNC: 88 MG/DL — SIGNIFICANT CHANGE UP (ref 70–99)
HCT VFR BLD CALC: 40.6 % — SIGNIFICANT CHANGE UP (ref 39–50)
HGB BLD-MCNC: 14.1 G/DL — SIGNIFICANT CHANGE UP (ref 13–17)
MCHC RBC-ENTMCNC: 30.1 PG — SIGNIFICANT CHANGE UP (ref 27–34)
MCHC RBC-ENTMCNC: 34.7 GM/DL — SIGNIFICANT CHANGE UP (ref 32–36)
MCV RBC AUTO: 86.6 FL — SIGNIFICANT CHANGE UP (ref 80–100)
NRBC # BLD: 0 /100 WBCS — SIGNIFICANT CHANGE UP (ref 0–0)
PLATELET # BLD AUTO: 185 K/UL — SIGNIFICANT CHANGE UP (ref 150–400)
POTASSIUM SERPL-MCNC: 3.6 MMOL/L — SIGNIFICANT CHANGE UP (ref 3.5–5.3)
POTASSIUM SERPL-SCNC: 3.6 MMOL/L — SIGNIFICANT CHANGE UP (ref 3.5–5.3)
RBC # BLD: 4.69 M/UL — SIGNIFICANT CHANGE UP (ref 4.2–5.8)
RBC # FLD: 13.7 % — SIGNIFICANT CHANGE UP (ref 10.3–14.5)
SODIUM SERPL-SCNC: 136 MMOL/L — SIGNIFICANT CHANGE UP (ref 135–145)
WBC # BLD: 7.28 K/UL — SIGNIFICANT CHANGE UP (ref 3.8–10.5)
WBC # FLD AUTO: 7.28 K/UL — SIGNIFICANT CHANGE UP (ref 3.8–10.5)

## 2018-04-15 PROCEDURE — 99233 SBSQ HOSP IP/OBS HIGH 50: CPT

## 2018-04-15 PROCEDURE — 95951: CPT | Mod: 26

## 2018-04-15 RX ORDER — LEVETIRACETAM 250 MG/1
1 TABLET, FILM COATED ORAL
Qty: 60 | Refills: 0
Start: 2018-04-15 | End: 2018-05-14

## 2018-04-15 RX ORDER — LEVETIRACETAM 250 MG/1
1 TABLET, FILM COATED ORAL
Qty: 60 | Refills: 0 | DISCHARGE
Start: 2018-04-15 | End: 2018-05-14

## 2018-04-15 RX ADMIN — Medication 100 MILLIGRAM(S): at 05:48

## 2018-04-15 RX ADMIN — Medication 1 TABLET(S): at 11:38

## 2018-04-15 RX ADMIN — LEVETIRACETAM 1000 MILLIGRAM(S): 250 TABLET, FILM COATED ORAL at 05:48

## 2018-04-15 RX ADMIN — APIXABAN 5 MILLIGRAM(S): 2.5 TABLET, FILM COATED ORAL at 05:48

## 2018-04-15 NOTE — DISCHARGE NOTE ADULT - CARE PROVIDERS DIRECT ADDRESSES
,DirectAddress_Unknown,[email or web address]

## 2018-04-15 NOTE — DISCHARGE NOTE ADULT - PATIENT PORTAL LINK FT
You can access the [facility] Patient Portal, offered by [facility], by registering with the following website: [email or web address] Hospital[facility]

## 2018-04-15 NOTE — DISCHARGE NOTE ADULT - HOSPITAL COURSE
68 yo male with PMH of a. 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 Pt is a poor historian and does not recall events of the day. [facility] EMS [facility] and was noted to be confused and not acting like himself. The workers called 911 when the patient was in his car as he was with AMS. He was last known to be his normal self at 430PM. In the ED pt was a CODE STROKE and was going for a CT of the head when he had a tonic clonic seizure which lasted about 1 minute as witnessed by RNs and staff in the CT machine. Pt was incontinent of urine and also bit his tongue. He has an abrasion on his left wrist. Upon arrival to ED and currently pt remains with global aphasia. He is still confused and A+Ox1 (only to self). He denies any fevers, chills, headaches, changes in vision, weakness, chest pain, palpitations, abd pain, N/V, dizziness. He was not noted to have a facial droop or slurred speech. As per wife pt did have a seizure x1 in 2015 post hemorrhagic CVA and no further episodes.       Vital Signs Last 24 Hrs  T(C): 36.8 (15 Apr 2018 05:11), Max: 36.8 (15 Apr 2018 05:11)  T(F): 98.2 (15 Apr 2018 05:11), Max: 98.2 (15 Apr 2018 05:11)  HR: 69 (15 Apr 2018 05:11) (62 - 70)  BP: 120/64 (15 Apr 2018 05:11) (120/64 - 147/81)  BP(mean): 78 (15 Apr 2018 05:11) (78 - 78)  RR: 18 (14 Apr 2018 17:22) (18 - 20)  SpO2: 98% (15 Apr 2018 05:11) (98% - 99%)    Physical Exam:  · Constitutional	detailed exam	  · Constitutional Details	well-developed; well-groomed; no distress; obese	  · Eyes	EOMI; PERRL; no drainage or redness	  · Neck	No bruits; no thyromegaly or nodules	  · Respiratory	Breath Sounds equal & clear to percussion & auscultation, no accessory muscle use	  · Cardiovascular	detailed exam	  · Cardiovascular Details	irregular rate and rhythm; tachycardia	  · Gastrointestinal	Soft, non-tender, no hepatosplenomegaly, normal bowel sounds	  · Extremities	detailed exam	        #Metabolic encephalopathy with global aphasia possibly post ictal - no CVA  #tonic clonic seizure  - CT head and CTA negative for ACUTE infarct, old infarcts noted, no hemodynamically significant stenosis  - MRI head: no acute CVA  -Follow 24 hour EEG: normal  - keppra bid  -No driving until cleared by neurologist: discussed with patient and wife at bed side.     # Hypokalemia-due to HCTZ  Replaced ans resolved    #Anion gap metabolic acidosis  -Resolved    #Hyponatremia possibly due to HCTZ: improving    #A. fib   - continue metoprolol  eliquis    #HTN  - continue metoprolol    Home today.  His PMD was notified.  Spent more than 30 minutes to prepare the discharge.

## 2018-04-15 NOTE — DISCHARGE NOTE ADULT - PLAN OF CARE
Prevent further attack Follow up with neurologist  No driving for one year and until cleared by neurologist.

## 2018-04-15 NOTE — DISCHARGE NOTE ADULT - CARE PROVIDER_API CALL
Vishal Ramos), Medicine  [address] [place] [address]  Phone: [phone]  Fax: [phone]    Leah Mckenna), Neurology  General  [address] [place] [address]  [address] [place] [address]  Phone: [phone]  Fax: [phone]

## 2018-04-15 NOTE — PROGRESS NOTE ADULT - SUBJECTIVE AND OBJECTIVE BOX
HPI:  Patient is 67-year-old with history of hypertension, chronic atrial fibrillation with history of CVA in June 2016 with almost complete recovery, high cholesterol, history of alcohol abuse in the past but he stopped consuming alcohol 2017, he was admitted after he had an episode of altered mental status. Patient states he went to a [facility] parlor and he became confused. In the emergency room patient was noted to be confused and he had an episode of seizure. Which lasted for 1 minute. Patient had become incontinent to urine and also had tongue bite. he has been alert and oriented , he denies any headache or any blurred vision. No further episode of seizure. He denies any chest pain or shortness of breath. He is undergoing 24-hour EEG. His wife is at bedside. He continues to be in atrial fibrillation heart rate is controlled.     CT Angio Head w/ IV Cont (04.12.18     FINDINGS: Comparison is made to CT head of same day.    The carotid circulation is intact without hemodynamically significant   stenosis.   The vertebral arteries are patent.         Intracranial vertebral arteries are intact without evidence of dissection   or hemodynamically significant stenosis. The basilar artery and posterior   cerebral arteries and are normal without hemodynamically significant   stenosis. Bilateral superior cerebellar arteries are intact. The   intracranial internal carotid arteries, middle cerebral arteries, and   anterior cerebral arteries are intact without hemodynamically significant   stenosis.  There is no evidence of aneurysm or vascular malformation.    The brain demonstrates no acute cerebral cortical infarct is seen.  No  evidence of midline shift.  The ventricles, sulci and basal cisterns   appear unremarkable.      The paranasal sinuses are clear.        IMPRESSION:          1.   Right carotid system:  No hemodynamically significant stenosis.          2.   Left carotid system:  No hemodynamically significant stenosis.           3.   Intracranial circulation:  No hemodynamically significant   stenosis.        PAST MEDICAL & SURGICAL HISTORY:  CVA (cerebral vascular accident)  Atrial fibrillation  Hypertension      PREVIOUS DIAGNOSTIC TESTING:      ECHO  FINDINGS: April 2018 normal LVEF  60-65%.    MEDICATIONS  (STANDING):  apixaban 5 milliGRAM(s) Oral every 12 hours  levETIRAcetam 1000 milliGRAM(s) Oral two times a day  metoprolol succinate  milliGRAM(s) Oral daily  multivitamin 1 Tablet(s) Oral daily  sodium chloride 0.9%. 1000 milliLiter(s) (100 mL/Hr) IV Continuous <Continuous>    MEDICATIONS  (PRN):  acetaminophen   Tablet. 650 milliGRAM(s) Oral every 6 hours PRN Mild Pain (1 - 3)  LORazepam   Injectable 2 milliGRAM(s) IV Push every 8 hours PRN Seizures  ondansetron Injectable 4 milliGRAM(s) IV Push every 6 hours PRN Nausea        REVIEW OF SYSTEM:  Pertinent items are noted in HPI.  Constitutional	Negative for chills, fevers, sweats.    Eyes: 	Negative for visual disturbance.  Ears, nose, mouth, throat, and face: Negative for epistaxis, nasal congestion, sore throat .  Neck:	Negative for enlargement, pain and difficulty in swallowing  Respiration : Negative for cough,  pleuritic chest pain and wheezing  Cardiovascular: Negative for chest pain,  and palpitations    Gastrointestinal : Negative for abdominal pain, diarrhea, nausea and vomiting  Genitourinary: Negative for dysuria, frequency and urinary incontinence .  Skin: Negative for  rash, pruritus, swelling, dryness .  	  Hematologic/lymphatic: Negative for bleeding and easy bruising  Musculoskeletal: Negative for arthralgias, back pain and muscle weakness.  Neurological: Negative for dizziness, headaches, seizures and tremors  Behavioral/Psych: Negative for mood change, depression.  Endocrine:	Negative for blurry vision, polydipsia and polyuria, diaphoresis.   Allergic/Immunologic:	Negative for anaphylaxis, angioedema and urticaria.      Vital Signs Last 24 Hrs  T(C): 36.8 (15 Apr 2018 05:11), Max: 36.8 (15 Apr 2018 05:11)  T(F): 98.2 (15 Apr 2018 05:11), Max: 98.2 (15 Apr 2018 05:11)  HR: 69 (15 Apr 2018 05:11) (62 - 69)  BP: 120/64 (15 Apr 2018 05:11) (120/64 - 147/81)  BP(mean): 78 (15 Apr 2018 05:11) (78 - 78)  RR: 18 (14 Apr 2018 17:22) (18 - 18)  SpO2: 98% (15 Apr 2018 05:11) (98% - 99%)    I&O's Summary    14 Apr 2018 07:01  -  15 Apr 2018 07:00  --------------------------------------------------------  IN: 0 mL / OUT: 1600 mL / NET: -1600 mL      PHYSICAL EXAM  General Appearance: cooperative, no acute distress,   HEENT: PERRL, conjunctiva clear, EOM's intact, non injected pharynx, no exudate, .  Neck: Supple, , no adenopathy, thyroid: not enlarged, no carotid bruit or JVD  Back: Symmetric, no  tenderness,no soft tissue tenderness  Lungs: Clear to auscultation bilateral,no adventitious breath sounds, normal   expiratory phase  Heart: Irregular rate and rhythm, S1, S2 normal, no murmur, rub or gallop  Abdomen: Soft, non-tender, bowel sounds active , no hepatosplenomegaly  Extremities: no cyanosis or edema, no joint swelling  Skin: Skin color, texture normal, no rashes   Neurologic: Alert and oriented X3 , cranial nerves intact, sensory and motor normal,        INTERPRETATION OF TELEMETRY: A Fib heart rate is controlled.        LABS:                          14.1   7.28  )-----------( 185      ( 15 Apr 2018 07:09 )             40.6     04-15    136  |  103  |  8   ----------------------------<  88  3.6   |  23  |  0.70    Ca    8.7      15 Apr 2018 07:09          Lipid Panel  158  51  91  82                RADIOLOGY & ADDITIONAL STUDIES:
· Reason for Referral/Consultation:	Seizures and AMS /Old CVA	      · Subjective and Objective: 	  Patient is a 67y old  Male who presents with a chief complaint of AMS, seizures .       HPI:  68 yo male with PMH of a. 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 Pt is a poor historian and does not recall events of the day. As per EMS documentation pt was at a local [employer] that he normally goes to and was noted to be confused and not acting like himself. The workers called 911 when the patient was in his car as he was with AMS. He was last known to be his normal self at 430PM. In the ED pt was a CODE STROKE and was going for a CT of the head when he had a tonic clonic seizure which lasted about 1 minute as witnessed by RNs and staff in the CT machine. Pt was incontinent of urine and also bit his tongue. He has an abrasion on his left wrist. Upon arrival to ED and currently pt remains with global aphasia. He is still confused and A+Ox1 (only to self). He denies any fevers, chills, headaches, changes in vision, weakness, chest pain, palpitations, abd pain, N/V, dizziness. He was not noted to have a facial droop or slurred speech. As per wife pt did have a seizure x1 in 2015 post hemorrhagic CVA and no further episodes. Today pt more awake, alert offers no c/o, wants to go home.   Pt not a candidate for TPA as he is on eliquis and he was declined by transfer center as per ED. Admitted and CT/ MRI done. Routine EEG were done.    4/15/18 : Pt feeling better. No new c/o. No recurrent seizures. Wants to go home. Completed 24 hr EEG just now. Tolerating Keppra, no side effects.     MEDICATIONS  (STANDING):  apixaban 5 milliGRAM(s) Oral every 12 hours  levETIRAcetam 1000 milliGRAM(s) Oral two times a day  metoprolol succinate  milliGRAM(s) Oral daily  multivitamin 1 Tablet(s) Oral daily  sodium chloride 0.9%. 1000 milliLiter(s) (100 mL/Hr) IV Continuous <Continuous>      Vital Signs Last 24 Hrs  T(C): 36.8 (15 Apr 2018 05:11), Max: 36.8 (15 Apr 2018 05:11)  T(F): 98.2 (15 Apr 2018 05:11), Max: 98.2 (15 Apr 2018 05:11)  HR: 69 (15 Apr 2018 05:11) (62 - 70)  BP: 120/64 (15 Apr 2018 05:11) (120/64 - 147/81)  BP(mean): 78 (15 Apr 2018 05:11) (78 - 78)  RR: 18 (14 Apr 2018 17:22) (18 - 20)  SpO2: 98% (15 Apr 2018 05:11) (98% - 99%)    Constitutional: awake and alert.  HEENT: PERRLA, EOMI,   Neck: Supple.  Respiratory: Breath sounds are clear bilaterally  Cardiovascular: S1 and S2, irregular rhythm  Gastrointestinal: soft, nontender  Extremities:  no edema  Vascular:  Carotid Bruit - no  Musculoskeletal: no joint swelling/tenderness, no abnormal movements  Skin: No rashes    Neurological exam:  HF: A x O x 3. Appropriately interactive, normal affect. Speech fluent, No Aphasia or paraphasic errors. Naming /repetition intact   CN: JESSY, EOMI, VFF, facial sensation normal, no NLFD, tongue midline, Palate moves equally, SCM equal bilaterally  Motor: No pronator drift, Strength 5/5 in all 4 ext, normal bulk and tone, no tremor, rigidity or bradykinesia.    Sens: Intact to light touch / PP/ VS/ JS    Reflexes: Symmetric and normal . BJ 2+, BR 2+, KJ 1+, AJ 1+, downgoing toes b/l  Coord:  No FNFA, dysmetria, WALT intact   Gait/Balance: Cannot test    NIHSS: 0                        14.1   7.28  )-----------( 185      ( 15 Apr 2018 07:09 )             40.6     04-15    136  |  103  |  8   ----------------------------<  88  3.6   |  23  |  0.70    Ca    8.7      15 Apr 2018 07:09      04-13 [id number] 5.4    04-13 Chol 158 LDL 91 HDL 51 Trig 82    Radiology report:  - CT Head with CTA brain 4/12/18  < from: CT Brain Stroke Protocol (04.12.18 @ 17:22) >  IMPRESSION:       No acute intracranial findings. Chronic appearing lacunar infarcts in the   bilateral external capsules and small old left posterior parietal lobe   infarct is again identified. Periventricular chronic microvascular   ischemic changes are present.     < from: CT Angio Head w/ IV Cont (04.12.18 @ 18:44) >    IMPRESSION:          1.   Right carotid system:  No hemodynamically significant stenosis.          2.   Left carotid system:  No hemodynamically significant stenosis.           3.   Intracranial circulation:  No hemodynamically significant   stenosis.        - MRI brain  4/13/18    IMPRESSION:  No acute infarct. Focal chronic infarct in the left   posterior temporal lobe and chronic lacunar infarcts in the bilateral   basal ganglia radiata are present.     - EEG 4/13/18  < from: EEG Awake or Drowsy (04.13.18 @ 09:00) >  Impression: Normal EEG performed with the patient awake and drowsy.    24 Hr EEG Monitor 4/15/ 18   < from: EEG Monitoring Each 24 hours (04.15.18 @ 09:00) >  Impression : This is a normal 24 hour ambulatory video EEG. Clinical   correlation recommended.
68 yo male with PMH of a. 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 Pt is a poor historian and does not recall events of the day. [facility] and was noted to be confused and not acting like himself. The workers called 911 when the patient was in his car as he was with AMS. He was last known to be his normal self at 430PM. In the ED pt was a CODE STROKE and was going for a CT of the head when he had a tonic clonic seizure which lasted about 1 minute as witnessed by RNs and staff in the CT machine. Pt was incontinent of urine and also bit his tongue. He has an abrasion on his left wrist. Upon arrival to ED and currently pt remains with global aphasia. He is still confused and A+Ox1 (only to self). He denies any fevers, chills, headaches, changes in vision, weakness, chest pain, palpitations, abd pain, N/V, dizziness. He was not noted to have a facial droop or slurred speech. As per wife pt did have a seizure x1 in 2015 post hemorrhagic CVA and no further episodes.         04/13/18: patient seen and examined. No more seizures last night. Discussed with patient regarding management and d/c plan.   04/14/18: Patient seen and examined. 24 hour EEG in progress. Discussed with patient in length regarding management and d/c plan including not drive for 1 year and also until cleared by neurologist. Discussed with Dr Mckenna.       Vital Signs Last 24 Hrs  T(C): 36.7 (14 Apr 2018 11:09), Max: 36.9 (13 Apr 2018 16:14)  T(F): 98.1 (14 Apr 2018 11:09), Max: 98.5 (13 Apr 2018 16:14)  HR: 70 (14 Apr 2018 11:09) (62 - 90)  BP: 120/84 (14 Apr 2018 11:09) (112/94 - 133/85)  BP(mean): --  RR: 20 (14 Apr 2018 11:09) (18 - 20)  SpO2: 98% (14 Apr 2018 11:09) (97% - 99%)        Physical Exam:  · Constitutional	detailed exam	  · Constitutional Details	well-developed; well-groomed; no distress; obese	  · Eyes	EOMI; PERRL; no drainage or redness	  · Neck	No bruits; no thyromegaly or nodules	  · Respiratory	Breath Sounds equal & clear to percussion & auscultation, no accessory muscle use	  · Cardiovascular	detailed exam	  · Cardiovascular Details	irregular rate and rhythm; tachycardia	  · Gastrointestinal	Soft, non-tender, no hepatosplenomegaly, normal bowel sounds	  · Extremities	detailed exam	  · Extremities Details	pedal edema	  · Pedal Edema Severity	1+	  · Vascular	Equal and normal pulses (carotid, femoral, dorsalis pedis)	  · Neurological	detailed exam	  · Mental Status	Alert and oriented x1 (only to name), pt with global aphasia	  · Cranial Nerve	2-12 grossly intact	  · Motor	strength 5/5 b/l upper and lower ext	              Labs:                                                13.2   5.58  )-----------( 165      ( 14 Apr 2018 06:35 )             38.5     14 Apr 2018 06:35    138    |  104    |  5      ----------------------------<  81     3.7     |  24     |  0.59     Ca    8.5        14 Apr 2018 06:35  Phos  2.4       13 Apr 2018 05:28  Mg     1.7       13 Apr 2018 05:28    TPro  6.8    /  Alb  3.3    /  TBili  0.5    /  DBili  x      /  AST  19     /  ALT  17     /  AlkPhos  80     13 Apr 2018 05:28    LIVER FUNCTIONS - ( 13 Apr 2018 05:28 )  Alb: 3.3 g/dL / Pro: 6.8 gm/dL / ALK PHOS: 80 U/L / ALT: 17 U/L / AST: 19 U/L / GGT: x           PT/INR - ( 12 Apr 2018 18:23 )   PT: 14.2 sec;   INR: 1.31 ratio         PTT - ( 12 Apr 2018 18:23 )  PTT:26.6 sec  CAPILLARY BLOOD GLUCOSE        CARDIAC MARKERS ( 12 Apr 2018 18:23 )  <0.015 ng/mL / x     / x     / x     / x                  MEDICATIONS:    apixaban 5 milliGRAM(s) Oral every 12 hours  levETIRAcetam  IVPB 1000 milliGRAM(s) IV Intermittent every 12 hours  metoprolol succinate  milliGRAM(s) Oral daily  multivitamin 1 Tablet(s) Oral daily  sodium chloride 0.9%. 1000 milliLiter(s) (100 mL/Hr) IV Continuous <Continuous>    MEDICATIONS  (PRN):  acetaminophen   Tablet. 650 milliGRAM(s) Oral every 6 hours PRN Mild Pain (1 - 3)  LORazepam   Injectable 2 milliGRAM(s) IV Push every 8 hours PRN Seizures  ondansetron Injectable 4 milliGRAM(s) IV Push every 6 hours PRN Nausea        Assessment and Plan:   Assessment:  · Assessment		  68 yo male with PMH of a. 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 Pt is a poor historian and does not recall events of the day. As per EMS [facility] and was noted to be confused and not acting like himself. The workers called 911 when the patient was in his car as he was with AMS. He was last known to be his normal self at 430PM. In the ED pt was a CODE STROKE and was going for a CT of the head when he had a tonic clonic seizure which lasted about 1 minute as witnessed by RNs and staff in the CT machine. Pt was incontinent of urine and also bit his tongue. He has an abrasion on his left wrist. Upon arrival to ED and currently pt remains with global aphasia. He is still confused and A+Ox1 (only to self). He denies any fevers, chills, headaches, changes in vision, weakness, chest pain, palpitations, abd pain, N/V, dizziness. He was not noted to have a facial droop or slurred speech. As per wife pt did have a seizure x1 in 2015 post hemorrhagic CVA and no further episodes.     #AMS with global aphasia possibly post ictal - no CVA  #tonic clonic seizure  - CT head and CTA negative for ACUTE infarct, old infarcts noted, no hemodynamically significant stenosis  - MRI head: no acute CVA  -Follow 24 hour EEG  - keppra IV BID, change to po  - neuro checks  - neuro follow up appreciated  - seizure precautions.    # Hypokalemia-due to HCTZ  Replaced ans resolved    #Anion gap metabolic acidosis  -Resolved    #Hyponatremia possibly due to HCTZ: improving  - hold HCTZ  - monitor Na levels    #A. fib   - continue metoprolol  - continue eliquis    #HTN  - continue metoprolol    #DVT prophylaxis  - continue eliquis    Possible d/c tomorrow.
68 yo male with PMH of a. 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 Pt is a poor historian and does not recall events of the day. As per EMS [facility] and was noted to be confused and not acting like himself. The workers called 911 when the patient was in his car as he was with AMS. He was last known to be his normal self at 430PM. In the ED pt was a CODE STROKE and was going for a CT of the head when he had a tonic clonic seizure which lasted about 1 minute as witnessed by RNs and staff in the CT machine. Pt was incontinent of urine and also bit his tongue. He has an abrasion on his left wrist. Upon arrival to ED and currently pt remains with global aphasia. He is still confused and A+Ox1 (only to self). He denies any fevers, chills, headaches, changes in vision, weakness, chest pain, palpitations, abd pain, N/V, dizziness. He was not noted to have a facial droop or slurred speech. As per wife pt did have a seizure x1 in 2015 post hemorrhagic CVA and no further episodes.         04/13/18: patient seen and examined. No more seizures last night. Discussed with patient regarding management and d/c plan.       Vital Signs Last 24 Hrs  T(C): 37.1 (13 Apr 2018 11:01), Max: 37.1 (12 Apr 2018 17:18)  T(F): 98.7 (13 Apr 2018 11:01), Max: 98.7 (12 Apr 2018 17:18)  HR: 70 (13 Apr 2018 11:01) (70 - 141)  BP: 127/86 (13 Apr 2018 11:01) (97/65 - 151/90)  BP(mean): --  RR: 17 (13 Apr 2018 11:01) (14 - 23)  SpO2: 96% (13 Apr 2018 11:01) (94% - 100%)        Physical Exam:  · Constitutional	detailed exam	  · Constitutional Details	well-developed; well-groomed; no distress; obese	  · Eyes	EOMI; PERRL; no drainage or redness	  · Neck	No bruits; no thyromegaly or nodules	  · Respiratory	Breath Sounds equal & clear to percussion & auscultation, no accessory muscle use	  · Cardiovascular	detailed exam	  · Cardiovascular Details	irregular rate and rhythm; tachycardia	  · Gastrointestinal	Soft, non-tender, no hepatosplenomegaly, normal bowel sounds	  · Extremities	detailed exam	  · Extremities Details	pedal edema	  · Pedal Edema Severity	1+	  · Vascular	Equal and normal pulses (carotid, femoral, dorsalis pedis)	  · Neurological	detailed exam	  · Mental Status	Alert and oriented x1 (only to name), pt with global aphasia	  · Cranial Nerve	2-12 grossly intact	  · Motor	strength 5/5 b/l upper and lower ext	              Labs:                         13.8   7.06  )-----------( 200      ( 13 Apr 2018 05:28 )             38.5     13 Apr 2018 05:28    134    |  99     |  6      ----------------------------<  94     3.2     |  24     |  0.74     Ca    8.9        13 Apr 2018 05:28  Phos  2.4       13 Apr 2018 05:28  Mg     1.7       13 Apr 2018 05:28    TPro  6.8    /  Alb  3.3    /  TBili  0.5    /  DBili  x      /  AST  19     /  ALT  17     /  AlkPhos  80     13 Apr 2018 05:28    LIVER FUNCTIONS - ( 13 Apr 2018 05:28 )  Alb: 3.3 g/dL / Pro: 6.8 gm/dL / ALK PHOS: 80 U/L / ALT: 17 U/L / AST: 19 U/L / GGT: x           PT/INR - ( 12 Apr 2018 18:23 )   PT: 14.2 sec;   INR: 1.31 ratio         PTT - ( 12 Apr 2018 18:23 )  PTT:26.6 sec  CAPILLARY BLOOD GLUCOSE      POCT Blood Glucose.: 138 mg/dL (12 Apr 2018 17:24)    CARDIAC MARKERS ( 12 Apr 2018 18:23 )  <0.015 ng/mL / x     / x     / x     / x                    MEDICATIONS:    apixaban 5 milliGRAM(s) Oral every 12 hours  levETIRAcetam  IVPB 1000 milliGRAM(s) IV Intermittent every 12 hours  metoprolol succinate  milliGRAM(s) Oral daily  multivitamin 1 Tablet(s) Oral daily  sodium chloride 0.9%. 1000 milliLiter(s) (100 mL/Hr) IV Continuous <Continuous>    MEDICATIONS  (PRN):  acetaminophen   Tablet. 650 milliGRAM(s) Oral every 6 hours PRN Mild Pain (1 - 3)  LORazepam   Injectable 2 milliGRAM(s) IV Push every 8 hours PRN Seizures  ondansetron Injectable 4 milliGRAM(s) IV Push every 6 hours PRN Nausea        Assessment and Plan:   Assessment:  · Assessment		  68 yo male with PMH of a. 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 Pt is a poor historian and does not recall events of the day. As per EMS documentation pt was at a local [place]izz[place] that he normally goes to and was noted to be confused and not acting like himself. The workers called 911 when the patient was in his car as he was with AMS. He was last known to be his normal self at 430PM. In the ED pt was a CODE STROKE and was going for a CT of the head when he had a tonic clonic seizure which lasted about 1 minute as witnessed by RNs and staff in the CT machine. Pt was incontinent of urine and also bit his tongue. He has an abrasion on his left wrist. Upon arrival to ED and currently pt remains with global aphasia. He is still confused and A+Ox1 (only to self). He denies any fevers, chills, headaches, changes in vision, weakness, chest pain, palpitations, abd pain, N/V, dizziness. He was not noted to have a facial droop or slurred speech. As per wife pt did have a seizure x1 in 2015 post hemorrhagic CVA and no further episodes.     #AMS with global aphasia possibly post ictal - r/o CVA  #tonic clonic seizure  - CT head and CTA negative for ACUTE infarct, old infarcts noted, no hemodynamically significant stenosis  - MRI head pending  -Consider 24 hour EEG  - keppra IV BID  - neuro checks  - neuro consult pending. Dr Keating was informed by ED physician.   - seizure precautions    # Hypokalemia-due to HCTZ  Replace    #Anion gap metabolic acidosis  - chronic as per wife at bedside, has previously been on sodium bicarb tabs at home  -Resolved    #Hyponatremia possibly due to HCTZ: improving  - hold HCTZ  - monitor Na levels    #A. fib   - continue metoprolol  - continue eliquis    #HTN  - continue metoprolol    #DVT prophylaxis  - continue eliquis
HPI:  Patient is 67-year-old with history of hypertension, chronic atrial fibrillation with history of CVA in June 2016 with almost complete recovery, high cholesterol, history of alcohol abuse in the past but he stopped consuming alcohol 2017, he was admitted after he had an episode of altered mental status. Patient states [facility] and he became confused. In the emergency room patient was noted to be confused and he had an episode of seizure. Which lasted for 1 minute. Patient had become incontinent to urine and also had tongue bite. Patient now is alert and oriented. She denies any chest pain, shortness of breath, palpitations or headache. He is in atrial fibrillation and heart rate is controlled. He denies any headache or any blurred vision. he has had no further episode of seizure since admission. His wife is at bedside. Patient now is asymptomatic.     CT Angio Head w/ IV Cont (04.12.18     FINDINGS: Comparison is made to CT head of same day.    The carotid circulation is intact without hemodynamically significant   stenosis.   The vertebral arteries are patent.         Intracranial vertebral arteries are intact without evidence of dissection   or hemodynamically significant stenosis. The basilar artery and posterior   cerebral arteries and are normal without hemodynamically significant   stenosis. Bilateral superior cerebellar arteries are intact. The   intracranial internal carotid arteries, middle cerebral arteries, and   anterior cerebral arteries are intact without hemodynamically significant   stenosis.  There is no evidence of aneurysm or vascular malformation.    The brain demonstrates no acute cerebral cortical infarct is seen.  No  evidence of midline shift.  The ventricles, sulci and basal cisterns   appear unremarkable.      The paranasal sinuses are clear.        IMPRESSION:          1.   Right carotid system:  No hemodynamically significant stenosis.          2.   Left carotid system:  No hemodynamically significant stenosis.           3.   Intracranial circulation:  No hemodynamically significant   stenosis.        PAST MEDICAL & SURGICAL HISTORY:  CVA (cerebral vascular accident)  Atrial fibrillation  Hypertension      PREVIOUS DIAGNOSTIC TESTING:      ECHO  FINDINGS: April 2018 normal LVEF  60-65%.  MEDICATIONS  (STANDING):  apixaban 5 milliGRAM(s) Oral every 12 hours  levETIRAcetam  IVPB 1000 milliGRAM(s) IV Intermittent every 12 hours  metoprolol succinate  milliGRAM(s) Oral daily  multivitamin 1 Tablet(s) Oral daily  sodium chloride 0.9%. 1000 milliLiter(s) (100 mL/Hr) IV Continuous <Continuous>    MEDICATIONS  (PRN):  acetaminophen   Tablet. 650 milliGRAM(s) Oral every 6 hours PRN Mild Pain (1 - 3)  LORazepam   Injectable 2 milliGRAM(s) IV Push every 8 hours PRN Seizures  ondansetron Injectable 4 milliGRAM(s) IV Push every 6 hours PRN Nausea        REVIEW OF SYSTEM:  Pertinent items are noted in HPI.  Constitutional	Negative for chills, fevers, sweats.    Eyes: 	Negative for visual disturbance.  Ears, nose, mouth, throat, and face: Negative for epistaxis, nasal congestion, sore throat .  Neck:	Negative for enlargement, pain and difficulty in swallowing  Respiration : Negative for cough, dyspnea on exertion, pleuritic chest pain and wheezing  Cardiovascular: Negative for chest pain, dyspnea and palpitations    Gastrointestinal : Negative for abdominal pain, diarrhea, nausea and vomiting  Genitourinary: Negative for dysuria, frequency and urinary incontinence .  Skin: Negative for  rash, pruritus, swelling, dryness .  	  Hematologic/lymphatic: Negative for bleeding and easy bruising  Musculoskeletal: Negative for arthralgias, back pain and muscle weakness.  Neurological: Negative for dizziness, headaches and tremors . No further seizures.  Behavioral/Psych: Negative for mood change, depression.  Endocrine:	Negative for blurry vision, polydipsia and polyuria, diaphoresis.   Allergic/Immunologic:	Negative for anaphylaxis, angioedema and urticaria.      Vital Signs Last 24 Hrs  T(C): 36.7 (14 Apr 2018 11:09), Max: 36.9 (13 Apr 2018 16:14)  T(F): 98.1 (14 Apr 2018 11:09), Max: 98.5 (13 Apr 2018 16:14)  HR: 70 (14 Apr 2018 11:09) (62 - 90)  BP: 120/84 (14 Apr 2018 11:09) (112/94 - 133/85)  BP(mean): --  RR: 20 (14 Apr 2018 11:09) (18 - 20)  SpO2: 98% (14 Apr 2018 11:09) (97% - 99%)    I&O's Summary    13 Apr 2018 07:01  -  14 Apr 2018 07:00  --------------------------------------------------------  IN: 2050 mL / OUT: 2650 mL / NET: -600 mL    14 Apr 2018 07:01  -  14 Apr 2018 12:26  --------------------------------------------------------  IN: 0 mL / OUT: 950 mL / NET: -950 mL      PHYSICAL EXAM  General Appearance: cooperative, no acute distress,   HEENT: PERRL, conjunctiva clear, EOM's intact, non injected pharynx, no exudate, TM   normal  Neck: Supple, , no adenopathy, thyroid: not enlarged, no carotid bruit or JVD  Back: Symmetric, no  tenderness,no soft tissue tenderness  Lungs: Clear to auscultation bilateral,no adventitious breath sounds, normal   expiratory phase  Heart: Regular rate and rhythm, S1, S2 normal, no murmur, rub or gallop  Abdomen: Soft, non-tender, bowel sounds active , no hepatosplenomegaly  Extremities: no cyanosis or edema, no joint swelling  Skin: Skin color, texture normal, no rashes   Neurologic: Alert and oriented X3 , cranial nerves intact, sensory and motor normal,        INTERPRETATION OF TELEMETRY: A Fib heart rate is controlled.            LABS:                          13.2   5.58  )-----------( 165      ( 14 Apr 2018 06:35 )             38.5     04-14    138  |  104  |  5<L>  ----------------------------<  81  3.7   |  24  |  0.59    Ca    8.5      14 Apr 2018 06:35  Phos  2.4     04-13  Mg     1.7     04-13    TPro  6.8  /  Alb  3.3  /  TBili  0.5  /  DBili  x   /  AST  19  /  ALT  17  /  AlkPhos  80  04-13    CARDIAC MARKERS ( 12 Apr 2018 18:23 )  <0.015 ng/mL / x     / x     / x     / x          Lipid Panel  158  51  91  82      PT/INR - ( 12 Apr 2018 18:23 )   PT: 14.2 sec;   INR: 1.31 ratio         PTT - ( 12 Apr 2018 18:23 )  PTT:26.6 sec          RADIOLOGY & ADDITIONAL STUDIES:

## 2018-04-15 NOTE — DISCHARGE NOTE ADULT - MEDICATION SUMMARY - MEDICATIONS TO TAKE
I will START or STAY ON the medications listed below when I get home from the hospital:    Eliquis 5 mg oral tablet  -- 1 tab(s) by mouth 2 times a day  -- Indication: For Atrial fibrillation    levETIRAcetam 1000 mg oral tablet  -- 1 tab(s) by mouth 2 times a day  -- Indication: For Seizures    Metoprolol Succinate  mg oral tablet, extended release  -- 1 tab(s) by mouth once a day  -- Indication: For Atrial fibrillation    amLODIPine 10 mg oral tablet  -- 1 tab(s) by mouth once a day  -- Indication: For Hypertension    hydroCHLOROthiazide 25 mg oral tablet  -- 1 tab(s) by mouth once a day  -- Indication: For Hypertension    hydrALAZINE 50 mg oral tablet  -- 1 tab(s) by mouth 2 times a day  -- Indication: For Hypertension    Multiple Vitamins oral tablet  -- 1 tab(s) by mouth once a day  -- Indication: For supplement

## 2018-04-15 NOTE — DISCHARGE NOTE ADULT - CARE PLAN
Principal Discharge DX:	Seizure disorder as sequela of cerebrovascular accident  Goal:	Prevent further attack  Assessment and plan of treatment:	Follow up with neurologist  No driving for one year and until cleared by neurologist.

## 2018-04-15 NOTE — PROGRESS NOTE ADULT - ASSESSMENT
Altered mental status/seizure episode. No further seizures , he has been alert and oriented.  History of CVA in the past.  Chronic atrial fibrillation. Heart rate is controlled on telemetry.  Hypertension.  Suggest  Observe on telemetry.  Continue with when cardiac medications.  Gradual ambulation.  Advised patient not to drive.  Discussed general condition patient and wife and hospitalist.  discussed with neurologist Dr. Mckenna.
Assessment and Recommendation:   · Assessment		  Patient is 67-year-old with history of hypertension, chronic atrial fibrillation with history of CVA in June 2016 with almost complete recovery, high cholesterol, history of alcohol abuse in the past  he was admitted after he had an episode of altered mental status. Patient states he went to a [facility] parlor and he became confused.In the emergency room patient was noted to be confused and he had an episode of seizure, Which lasted for 1 minute.    New onset Seizures likely from Underlying old CVA  R/o Metabolic causes.  REc EEG monitoring for 24 hrs reported normal.  continue Keppra 1000 mg q 12 hrs.  Advise  not to drive.  Continue other meds.  Correct any underlying metabolic causes.  Discussed with Pt, his wife  at bed side and .   D/c planning.  F/u as out Pt with Nuer[facility] with Dr. Keating or with [facility]
Altered mental status/seizure episode. No further seizures   History of CVA in the past.  Chronic atrial fibrillation. Heart rate is controlled on telemetry.  Hypertension.  Suggest  Observe on telemetry.  Continue with beta blockers and anticoagulation with Eliquis.  Neurological workup is in progress.  Gradual ambulation.  Discussed general condition patient and wife and hospitalist.  discussed with neurologist Dr. Mckenna.

## 2018-04-17 DIAGNOSIS — R41.0 DISORIENTATION, UNSPECIFIED: ICD-10-CM

## 2018-04-17 DIAGNOSIS — E87.6 HYPOKALEMIA: ICD-10-CM

## 2018-04-17 DIAGNOSIS — E87.1 HYPO-OSMOLALITY AND HYPONATREMIA: ICD-10-CM

## 2018-04-17 DIAGNOSIS — E87.2 ACIDOSIS: ICD-10-CM

## 2018-04-17 DIAGNOSIS — Z79.02 LONG TERM (CURRENT) USE OF ANTITHROMBOTICS/ANTIPLATELETS: ICD-10-CM

## 2018-04-17 DIAGNOSIS — I48.2 CHRONIC ATRIAL FIBRILLATION: ICD-10-CM

## 2018-04-17 DIAGNOSIS — T50.2X5A ADVERSE EFFECT OF CARBONIC-ANHYDRASE INHIBITORS, BENZOTHIADIAZIDES AND OTHER DIURETICS, INITIAL ENCOUNTER: ICD-10-CM

## 2018-04-17 DIAGNOSIS — E78.5 HYPERLIPIDEMIA, UNSPECIFIED: ICD-10-CM

## 2018-04-17 DIAGNOSIS — G40.409 OTHER GENERALIZED EPILEPSY AND EPILEPTIC SYNDROMES, NOT INTRACTABLE, WITHOUT STATUS EPILEPTICUS: ICD-10-CM

## 2018-04-17 DIAGNOSIS — I10 ESSENTIAL (PRIMARY) HYPERTENSION: ICD-10-CM

## 2018-04-17 DIAGNOSIS — R47.01 APHASIA: ICD-10-CM

## 2018-04-17 DIAGNOSIS — I69.398 OTHER SEQUELAE OF CEREBRAL INFARCTION: ICD-10-CM

## 2018-04-17 DIAGNOSIS — Z87.891 PERSONAL HISTORY OF NICOTINE DEPENDENCE: ICD-10-CM

## 2018-04-17 DIAGNOSIS — F10.21 ALCOHOL DEPENDENCE, IN REMISSION: ICD-10-CM

## 2018-04-17 DIAGNOSIS — G93.41 METABOLIC ENCEPHALOPATHY: ICD-10-CM

## 2019-10-14 NOTE — PROGRESS NOTE ADULT - PROVIDER SPECIALTY LIST ADULT
Cardiology
Hospitalist
Hospitalist
Cardiology
Pt. was left supine in bed post PT Evaluation, no apparent distress. SARITA Ayala present and aware of pt. status/participation in PT.

## 2020-09-30 NOTE — ED ADULT NURSE NOTE - PAIN RATING/NUMBER SCALE (0-10): REST
[de-identified] : 76M with PMHx afib AF/VT now s/p PPM (9/19), CKD, s/p AV and MV repairs, HTN, compensated diastolic chronic HF, prediabetes referred by Dr. Chandler for evaluation of rectal pain. \par \par Patient reports that has started having some rectal pain and "feels something is there" when he wipes. has not seen blood \par no pain in stomach, nausea, vomiting, diarrhea or constipation \par had colonoscopy in 2017 that revealed left sided diverticulosis and internal hemorrhoids\par \par 2018 ran a marathon (26.2 miles) but since having many heart issues has not been as active \par  0

## 2021-06-08 NOTE — ED PROVIDER NOTE - CONSTITUTIONAL NEGATIVE STATEMENT, MLM
Code Status:  DNR  Visit Type: Problem Visit     Facility:  [facility] [[id number]]             History of Present Illness: Jaye Soto is a 79 y.o. female who I am seeing today for follow up on the TCU. Pt recently hospitalized on 5/27/2020.  History includes hypothyroidism.  Patient presented to the ER after a mechanical fall in which she slipped over her wheeled wardrobe and fell onto the right side.  Patient found to have a right femoral neck fracture.  Patient status post right bipolar hemiarthroplasty on 5/28/2020.  Patient had mild acute blood loss anemia postoperative with hemoglobin down to 9.7.  Asymptomatic.  Tolerated well.  She also had postop urinary retention initially and required straight cath but later resolved.      Today patient lying in bed.  In edema to bilateral lower extremities improving.  She continues on BRYANT hose.  Cision to right hip intact with Tegaderm dressing.  Pain well-controlled Tylenol and oxycodone.  Patient continues on Xarelto for DVT prophylactics.    Active Ambulatory Problems     Diagnosis Date Noted     Hypothyroidism 01/14/2019     Glaucoma 01/14/2019     Closed right hip fracture, initial encounter (H) 05/27/2020     Closed displaced fracture of right femoral neck (H) 05/28/2020     Resolved Ambulatory Problems     Diagnosis Date Noted     No Resolved Ambulatory Problems     Past Medical History:   Diagnosis Date     Disease of thyroid gland      Osteoporosis        Current Outpatient Medications   Medication Sig     cholecalciferol, vitamin D3, (VITAMIN D3) 1,000 unit capsule Take 1,000 Units by mouth daily.     cyanocobalamin 100 MCG tablet Take 100 mcg by mouth daily.     latanoprost (XALATAN) 0.005 % ophthalmic solution Administer 1 drop to both eyes at bedtime.      levothyroxine (SYNTHROID, LEVOTHROID) 100 MCG tablet TAKE 1 TABLET (100 MCG TOTAL) BY MOUTH DAILY.     LUTEIN EXTRACT-ZEAXANTHIN EXT ORAL Take 1 tablet by mouth daily.      lysine 500  mg Tab Take 500 mg by mouth daily.      oxyCODONE (ROXICODONE) 5 MG immediate release tablet Take 0.5 tablets (2.5 mg total) by mouth every 4 (four) hours as needed.     polyethylene glycol (MIRALAX) 17 gram packet Take 1 packet (17 g total) by mouth daily.     rivaroxaban ANTICOAGULANT (XARELTO) 10 mg tablet Take 1 tablet (10 mg total) by mouth daily.     timolol maleate (TIMOPTIC) 0.5 % ophthalmic solution Administer 1 drop to both eyes 2 (two) times a day. Am and afternoon     turmeric/turmeric ext/pepr ext (TURMERIC-TURMERIC EXT-PEPPER) 500-3 mg cap Take 1 capsule by mouth daily.      ubidecarenone/vitamin E mixed (COQ10  ORAL) Take 1 tablet by mouth daily.        Allergies   Allergen Reactions     Ceftin [Cefuroxime Axetil] Anaphylaxis     Tongue swelling     Dorzolamide Shortness Of Breath and Itching     Short of breath,      Naprosyn [Naproxen] Anaphylaxis     Tongue swelling, throat closing     Cat Dander Cough     Nasal congestion     Codeine Hives     Latex Hives     Oyster Nausea And Vomiting     Paba Forte Hives     Penicillins Hives     1950's         Review of Systems  No fevers or chills. No headache, lightheadedness or dizziness. No SOB, chest pains or palpitations. Appetite is good. No nausea, vomiting, constipation or diarrhea. No dysuria, frequency, burning or pain with urination.  Pain well controlled in right hip with Tylenol and oxycodone.  Otherwise review of systems are negative.     Physical Exam  PHYSICAL EXAMINATION:  Vital signs: /60, pulse 73, respirations 20, temperature 98.6, O2 sat 93% on room air.  Weight 122.2 pounds.  General: Awake, Alert, oriented x3, appropriately, follows simple commands, conversant  HEENT:Pink conjunctiva, anicteric sclerae, moist oral mucosa  NECK: Supple  CVS:  S1  S2, without murmur or gallop.   LUNG: Clear to auscultation, No wheezes, rales or rhonci.  BACK: No kyphosis of the thoracic spine  ABDOMEN: Soft, nontender to palpation, with  positive bowel sounds  EXTREMITIES: Movesboth upper and lower extremities with generalized weakness , 21+ pedal edema, no calf tenderness BRYANT hose on.   SKIN: Warm and dry, no rashes or erythema noted.  Incision to right hip intact with Tegaderm dressing.  NEUROLOGIC: Intact, pulses palpable  PSYCHIATRIC: Cognition intact pleasant affect.       Labs:   Lab Results   Component Value Date    WBC 10.3 05/28/2020    HGB 9.4 (L) 06/02/2020    HCT 37.2 05/28/2020    MCV 94 05/28/2020     05/28/2020     Results for orders placed or performed during the hospital encounter of 05/27/20   Basic Metabolic Panel   Result Value Ref Range    Sodium 138 136 - 145 mmol/L    Potassium 4.2 3.5 - 5.0 mmol/L    Chloride 104 98 - 107 mmol/L    CO2 27 22 - 31 mmol/L    Anion Gap, Calculation 7 5 - 18 mmol/L    Glucose 105 70 - 125 mg/dL    Calcium 8.4 (L) 8.5 - 10.5 mg/dL    BUN 6 (L) 8 - 28 mg/dL    Creatinine 0.59 (L) 0.60 - 1.10 mg/dL    GFR MDRD Af Amer >60 >60 mL/min/1.73m2    GFR MDRD Non Af Amer >60 >60 mL/min/1.73m2           Assessment/Plan:  1. Closed displaced fracture of right femoral neck (H)   patient TTWB.    2. History of right hip hemiarthroplasty   continue Xarelto for DVT prophylactics.  Lower extremity edema.  Continue BRYANT hose.  On the a.m. off at at bedtime.     3. Acute blood loss anemia   hemoglobin today 9.7.  Follow-up hemoglobin in 1 week.   4. Pain management   pain well controlled with Tylenol and oxycodone.     Pt will need a standard gustabo wheelchair upon discharge. Pt with femur fracture ORIF. Pt is TTWB RLE. She has mobility limitations impairing her ability to participate in ADLS such toileting, feeding, dressing, grooming and bathing in customary locations in the home without wheelchair.[place] height due to short stature and feet not touching the ground. Light weight for each of patient/caregiver to use and transport.  Patient/cargiver are able to safely use a manual wheelchair. Pt's functional  mobility deficit can be sufficiently resolved by the use of a manual wheel chair. Patients mobility can't be safely resolved by use of a cane, walker or crutches.       Electronically signed by: Cintia Cabrera CNP      no fever and no chills.

## 2022-11-18 ENCOUNTER — OFFICE (OUTPATIENT)
Dept: URBAN - METROPOLITAN AREA CLINIC 94 | Facility: CLINIC | Age: 72
Setting detail: OPHTHALMOLOGY
End: 2022-11-18
Payer: MEDICARE

## 2022-11-18 DIAGNOSIS — H16.222: ICD-10-CM

## 2022-11-18 DIAGNOSIS — H16.221: ICD-10-CM

## 2022-11-18 DIAGNOSIS — H43.812: ICD-10-CM

## 2022-11-18 DIAGNOSIS — H33.312: ICD-10-CM

## 2022-11-18 DIAGNOSIS — H35.371: ICD-10-CM

## 2022-11-18 DIAGNOSIS — H16.223: ICD-10-CM

## 2022-11-18 DIAGNOSIS — H35.033: ICD-10-CM

## 2022-11-18 PROCEDURE — 99213 OFFICE O/P EST LOW 20 MIN: CPT | Performed by: OPHTHALMOLOGY

## 2022-11-18 PROCEDURE — 83861 MICROFLUID ANALY TEARS: CPT | Performed by: OPHTHALMOLOGY

## 2022-11-18 PROCEDURE — 92134 CPTRZ OPH DX IMG PST SGM RTA: CPT | Performed by: OPHTHALMOLOGY

## 2022-11-18 ASSESSMENT — REFRACTION_CURRENTRX
OS_CYLINDER: -1.00
OS_OVR_VA: 20/
OS_SPHERE: -0.75
OD_OVR_VA: 20/
OD_AXIS: 127
OD_CYLINDER: -0.75
OD_VPRISM_DIRECTION: SV
OD_SPHERE: +0.25
OS_VPRISM_DIRECTION: SV
OS_AXIS: 086

## 2022-11-18 ASSESSMENT — REFRACTION_AUTOREFRACTION
OD_AXIS: 113
OS_AXIS: 085
OD_SPHERE: +0.50
OS_CYLINDER: -0.25
OS_SPHERE: -1.75
OD_CYLINDER: -1.00

## 2022-11-18 ASSESSMENT — KERATOMETRY
OS_K1POWER_DIOPTERS: 41.50
OS_K2POWER_DIOPTERS: 41.50
OS_AXISANGLE_DEGREES: 090
OD_AXISANGLE_DEGREES: 030
OD_K2POWER_DIOPTERS: 42.00
METHOD_AUTO_MANUAL: AUTO
OD_K1POWER_DIOPTERS: 41.50

## 2022-11-18 ASSESSMENT — AXIALLENGTH_DERIVED
OS_AL: 25.154
OD_AL: 24.2527

## 2022-11-18 ASSESSMENT — VISUAL ACUITY
OD_BCVA: 20/80-
OS_BCVA: 20/30-

## 2022-11-18 ASSESSMENT — SUPERFICIAL PUNCTATE KERATITIS (SPK)
OD_SPK: T
OS_SPK: T

## 2022-11-18 ASSESSMENT — SPHEQUIV_DERIVED
OS_SPHEQUIV: -1.875
OD_SPHEQUIV: 0

## 2022-11-18 ASSESSMENT — TONOMETRY
OD_IOP_MMHG: 16
OS_IOP_MMHG: 18

## 2022-12-08 ENCOUNTER — INPATIENT (INPATIENT)
Facility: HOSPITAL | Age: 72
LOS: 0 days | Discharge: HOME | DRG: 101 | End: 2022-12-09
Attending: STUDENT IN AN ORGANIZED HEALTH CARE EDUCATION/TRAINING PROGRAM | Admitting: STUDENT IN AN ORGANIZED HEALTH CARE EDUCATION/TRAINING PROGRAM
Payer: COMMERCIAL

## 2022-12-08 VITALS — DIASTOLIC BLOOD PRESSURE: 141 MMHG | OXYGEN SATURATION: 97 % | SYSTOLIC BLOOD PRESSURE: 213 MMHG | HEART RATE: 111 BPM

## 2022-12-08 LAB
ALBUMIN SERPL ELPH-MCNC: 3.3 G/DL — SIGNIFICANT CHANGE UP (ref 3.3–5)
ALP SERPL-CCNC: 77 U/L — SIGNIFICANT CHANGE UP (ref 40–120)
ALT FLD-CCNC: 26 U/L — SIGNIFICANT CHANGE UP (ref 12–78)
ANION GAP SERPL CALC-SCNC: 12 MMOL/L — SIGNIFICANT CHANGE UP (ref 5–17)
APPEARANCE UR: CLEAR — SIGNIFICANT CHANGE UP
APTT BLD: 33.8 SEC — SIGNIFICANT CHANGE UP (ref 27.5–35.5)
AST SERPL-CCNC: 20 U/L — SIGNIFICANT CHANGE UP (ref 15–37)
BASOPHILS # BLD AUTO: 0.04 K/UL — SIGNIFICANT CHANGE UP (ref 0–0.2)
BASOPHILS NFR BLD AUTO: 0.4 % — SIGNIFICANT CHANGE UP (ref 0–2)
BILIRUB SERPL-MCNC: 0.7 MG/DL — SIGNIFICANT CHANGE UP (ref 0.2–1.2)
BILIRUB UR-MCNC: NEGATIVE — SIGNIFICANT CHANGE UP
BUN SERPL-MCNC: 14 MG/DL — SIGNIFICANT CHANGE UP (ref 7–23)
CALCIUM SERPL-MCNC: 8.3 MG/DL — LOW (ref 8.5–10.1)
CHLORIDE SERPL-SCNC: 97 MMOL/L — SIGNIFICANT CHANGE UP (ref 96–108)
CO2 SERPL-SCNC: 20 MMOL/L — LOW (ref 22–31)
COLOR SPEC: YELLOW — SIGNIFICANT CHANGE UP
CREAT SERPL-MCNC: 0.73 MG/DL — SIGNIFICANT CHANGE UP (ref 0.5–1.3)
DIFF PNL FLD: NEGATIVE — SIGNIFICANT CHANGE UP
EGFR: 96 ML/MIN/1.73M2 — SIGNIFICANT CHANGE UP
EOSINOPHIL # BLD AUTO: 0.11 K/UL — SIGNIFICANT CHANGE UP (ref 0–0.5)
EOSINOPHIL NFR BLD AUTO: 1.2 % — SIGNIFICANT CHANGE UP (ref 0–6)
FLUAV AG NPH QL: SIGNIFICANT CHANGE UP
FLUBV AG NPH QL: SIGNIFICANT CHANGE UP
GLUCOSE SERPL-MCNC: 116 MG/DL — HIGH (ref 70–99)
GLUCOSE UR QL: NEGATIVE — SIGNIFICANT CHANGE UP
HCT VFR BLD CALC: 46.5 % — SIGNIFICANT CHANGE UP (ref 39–50)
HGB BLD-MCNC: 16.3 G/DL — SIGNIFICANT CHANGE UP (ref 13–17)
IMM GRANULOCYTES NFR BLD AUTO: 0.4 % — SIGNIFICANT CHANGE UP (ref 0–0.9)
INR BLD: 1.36 RATIO — HIGH (ref 0.88–1.16)
KETONES UR-MCNC: NEGATIVE — SIGNIFICANT CHANGE UP
LEUKOCYTE ESTERASE UR-ACNC: NEGATIVE — SIGNIFICANT CHANGE UP
LYMPHOCYTES # BLD AUTO: 4.37 K/UL — HIGH (ref 1–3.3)
LYMPHOCYTES # BLD AUTO: 47.4 % — HIGH (ref 13–44)
MAGNESIUM SERPL-MCNC: 1.7 MG/DL — SIGNIFICANT CHANGE UP (ref 1.6–2.6)
MCHC RBC-ENTMCNC: 31.1 PG — SIGNIFICANT CHANGE UP (ref 27–34)
MCHC RBC-ENTMCNC: 35.1 GM/DL — SIGNIFICANT CHANGE UP (ref 32–36)
MCV RBC AUTO: 88.7 FL — SIGNIFICANT CHANGE UP (ref 80–100)
MONOCYTES # BLD AUTO: 0.68 K/UL — SIGNIFICANT CHANGE UP (ref 0–0.9)
MONOCYTES NFR BLD AUTO: 7.4 % — SIGNIFICANT CHANGE UP (ref 2–14)
NEUTROPHILS # BLD AUTO: 3.97 K/UL — SIGNIFICANT CHANGE UP (ref 1.8–7.4)
NEUTROPHILS NFR BLD AUTO: 43.2 % — SIGNIFICANT CHANGE UP (ref 43–77)
NITRITE UR-MCNC: NEGATIVE — SIGNIFICANT CHANGE UP
NRBC # BLD: 0 /100 WBCS — SIGNIFICANT CHANGE UP (ref 0–0)
PCP SPEC-MCNC: SIGNIFICANT CHANGE UP
PH UR: 7 — SIGNIFICANT CHANGE UP (ref 5–8)
PHOSPHATE SERPL-MCNC: 2 MG/DL — LOW (ref 2.5–4.5)
PLATELET # BLD AUTO: 211 K/UL — SIGNIFICANT CHANGE UP (ref 150–400)
POTASSIUM SERPL-MCNC: 3.1 MMOL/L — LOW (ref 3.5–5.3)
POTASSIUM SERPL-SCNC: 3.1 MMOL/L — LOW (ref 3.5–5.3)
PROT SERPL-MCNC: 6.5 G/DL — SIGNIFICANT CHANGE UP (ref 6–8.3)
PROT UR-MCNC: 30 MG/DL
PROTHROM AB SERPL-ACNC: 16 SEC — HIGH (ref 10.5–13.4)
RBC # BLD: 5.24 M/UL — SIGNIFICANT CHANGE UP (ref 4.2–5.8)
RBC # FLD: 13.3 % — SIGNIFICANT CHANGE UP (ref 10.3–14.5)
RSV RNA NPH QL NAA+NON-PROBE: SIGNIFICANT CHANGE UP
SARS-COV-2 RNA SPEC QL NAA+PROBE: SIGNIFICANT CHANGE UP
SODIUM SERPL-SCNC: 129 MMOL/L — LOW (ref 135–145)
SP GR SPEC: 1 — LOW (ref 1.01–1.02)
TROPONIN I, HIGH SENSITIVITY RESULT: 10.6 NG/L — SIGNIFICANT CHANGE UP
UROBILINOGEN FLD QL: NEGATIVE — SIGNIFICANT CHANGE UP
WBC # BLD: 9.21 K/UL — SIGNIFICANT CHANGE UP (ref 3.8–10.5)
WBC # FLD AUTO: 9.21 K/UL — SIGNIFICANT CHANGE UP (ref 3.8–10.5)

## 2022-12-08 PROCEDURE — 70498 CT ANGIOGRAPHY NECK: CPT | Mod: 26,MA

## 2022-12-08 PROCEDURE — 71045 X-RAY EXAM CHEST 1 VIEW: CPT | Mod: 26

## 2022-12-08 PROCEDURE — 0042T: CPT | Mod: MA

## 2022-12-08 PROCEDURE — 99291 CRITICAL CARE FIRST HOUR: CPT

## 2022-12-08 PROCEDURE — 70496 CT ANGIOGRAPHY HEAD: CPT | Mod: 26,MA

## 2022-12-08 RX ORDER — LEVETIRACETAM 250 MG/1
1000 TABLET, FILM COATED ORAL ONCE
Refills: 0 | Status: COMPLETED | OUTPATIENT
Start: 2022-12-08 | End: 2022-12-08

## 2022-12-08 RX ORDER — SODIUM CHLORIDE 9 MG/ML
1000 INJECTION INTRAMUSCULAR; INTRAVENOUS; SUBCUTANEOUS ONCE
Refills: 0 | Status: COMPLETED | OUTPATIENT
Start: 2022-12-08 | End: 2022-12-08

## 2022-12-08 RX ORDER — POTASSIUM CHLORIDE 20 MEQ
10 PACKET (EA) ORAL
Refills: 0 | Status: COMPLETED | OUTPATIENT
Start: 2022-12-08 | End: 2022-12-09

## 2022-12-08 RX ADMIN — SODIUM CHLORIDE 1000 MILLILITER(S): 9 INJECTION INTRAMUSCULAR; INTRAVENOUS; SUBCUTANEOUS at 23:30

## 2022-12-08 RX ADMIN — Medication 1 MILLIGRAM(S): at 21:30

## 2022-12-08 RX ADMIN — LEVETIRACETAM 440 MILLIGRAM(S): 250 TABLET, FILM COATED ORAL at 23:19

## 2022-12-08 NOTE — ED PROVIDER NOTE - CRITICAL CARE ATTENDING CONTRIBUTION TO CARE
Patient's is a patient is a 72-year-old male who presents to the emergency room with a chief complaint of altered mental status and seizure-like activity.  Past medical history of a CVA in 2015 no residual deficit and a history of a seizure in 2016 on Keppra, hypertension, A. fib on Eliquis.  On arrival to the emergency room patient is a weak but unable to answer any questions appropriately appears to be making nonsensical noises.  Wife reports that around 8 8:15 PM while patient was working at a computer she started hearing that he was making strange noises.  She went to ask him what was wrong but he did not really answer her then seem to get up and go to the bathroom but appeared to be confused trying to pull up his zipper.  She then reports that she noted some right-sided weakness.  She was concerned secondary to his previous history of a CVA EMS was called.  While in route to the hospital for EMS patient had a witnessed tonic-clonic seizure.  On arrival patient was altered awake maintaining an airway but not following any commands and not answering questions.  NIH was performed after patient returned from CT with significant improvement in his mental status.  Wife was questioned about patient's medications confirms he is on Eliquis but she is not clear when he last took this as he is in charge of his own medications  Patient presenting to the emergency room with altered mental status concern for possible seizure versus CVA.  Last known well was at a 8:15 AM but given the fact that patient had seizure activity prior to arrival per EMS and given the fact that patient is on Eliquis and we cannot confirm the last time he took this medication he would not be considered a tPA candidate.  This was reviewed with patient's wife at bedside who is RN.  Medications were confirmed with patient's wife at the bedside.  Patient's was protecting his airway although initially not following any commands or answering questions.  He was taken directly to CAT scan after a dose of Ativan was given for seizure prophylaxis.  9:50 PM Dr. Teague no acute intracranial hemorrhage mass-effect or evidence of acute vascular infarct.  CTA imaging with no significant flow-limiting stenosis or evidence of acute dissection.  There is some perfusion mismatch with this is in the area of the old infarct.  Last blood pressure recycled with a systolic of 170.  Repeat exam after CT imaging significantly improved patient now with an NIH of 2.  Residual deficits could be just related to a postictal state but ischemic CVA must still be considered on the differential.  Dr. Arora from neurology consulted and agreement with plan for Keppra and admission for further work-up.  Aspirin ordered pending dysphagia screen.  Wife updated on current plan of care.  Of note patient was noted to have a sodium of 129 and a potassium of 3.1 supplementation was ordered and repeat labs were ordered. Patient's is a patient is a 72-year-old male who presents to the emergency room with a chief complaint of altered mental status and seizure-like activity.  Past medical history of a CVA in 2015 no residual deficit and a history of a seizure in 2016 on Keppra, hypertension, A. fib on Eliquis.  On arrival to the emergency room patient is a weak but unable to answer any questions appropriately appears to be making nonsensical noises.  Wife reports that around 8 8:15 PM while patient was working at a computer she started hearing that he was making strange noises.  She went to ask him what was wrong but he did not really answer her then seem to get up and go to the bathroom but appeared to be confused trying to pull up his zipper.  She then reports that she noted some right-sided weakness.  She was concerned secondary to his previous history of a CVA EMS was called.  While in route to the hospital for EMS patient had a witnessed tonic-clonic seizure.  On arrival patient was altered awake maintaining an airway but not following any commands and not answering questions.  NIH was performed after patient returned from CT with significant improvement in his mental status.  Wife was questioned about patient's medications confirms he is on Eliquis but she is not clear when he last took this as he is in charge of his own medications  Patient presenting to the emergency room with altered mental status concern for possible seizure versus CVA.  Last known well was at a 8:15 AM but given the fact that patient had seizure activity prior to arrival per EMS and given the fact that patient is on Eliquis and we cannot confirm the last time he took this medication he would not be considered a tPA candidate.  This was reviewed with patient's wife at bedside who is RN.  Medications were confirmed with patient's wife at the bedside.  Patient's was protecting his airway although initially not following any commands or answering questions.  He was taken directly to CAT scan after a dose of Ativan was given for seizure prophylaxis.  9:50 PM Dr. Teague no acute intracranial hemorrhage mass-effect or evidence of acute vascular infarct.  CTA imaging with no significant flow-limiting stenosis or evidence of acute dissection.  There is some perfusion mismatch with this is in the area of the old infarct.  Last blood pressure recycled with a systolic of 170.  Repeat exam after CT imaging significantly improved patient now with an NIH of 2.  Residual deficits could be just related to a postictal state but ischemic CVA must still be considered on the differential.  Dr. Arora from neurology consulted and agreement with plan for Keppra and admission for further work-up.  Aspirin ordered pending dysphagia screen.  Wife updated on current plan of care.  Of note patient was noted to have a sodium of 129 and a potassium of 3.1 supplementation was ordered and repeat labs were ordered. Pt remained stable was accepted to medicine

## 2022-12-08 NOTE — ED PROVIDER NOTE - CLINICAL SUMMARY MEDICAL DECISION MAKING FREE TEXT BOX
Patient's is a patient is a 72-year-old male who presents to the emergency room with a chief complaint of altered mental status and seizure-like activity.  Past medical history of a CVA in 2015 no residual deficit and a history of a seizure in 2016 on Keppra, hypertension, A. fib on Eliquis.  On arrival to the emergency room patient is a weak but unable to answer any questions appropriately appears to be making nonsensical noises.  Wife reports that around 8 8:15 PM while patient was working at a computer she started hearing that he was making strange noises.  She went to ask him what was wrong but he did not really answer her then seem to get up and go to the bathroom but appeared to be confused trying to pull up his zipper.  She then reports that she noted some right-sided weakness.  She was concerned secondary to his previous history of a CVA EMS was called.  While in route to the hospital for EMS patient had a witnessed tonic-clonic seizure.  On arrival patient was altered awake maintaining an airway but not following any commands and not answering questions.  NIH was performed after patient returned from CT with significant improvement in his mental status.  Wife was questioned about patient's medications confirms he is on Eliquis but she is not clear when he last took this as he is in charge of his own medications  Patient presenting to the emergency room with altered mental status concern for possible seizure versus CVA.  Last known well was at a 8:15 AM but given the fact that patient had seizure activity prior to arrival per EMS and given the fact that patient is on Eliquis and we cannot confirm the last time he took this medication he would not be considered a tPA candidate.  This was reviewed with patient's wife at bedside who is RN.  Medications were confirmed with patient's wife at the bedside.  Patient's was protecting his airway although initially not following any commands or answering questions.  He was taken directly to CAT scan after a dose of Ativan was given for seizure prophylaxis.  9:50 PM Dr. Teague no acute intracranial hemorrhage mass-effect or evidence of acute vascular infarct.  CTA imaging with no significant flow-limiting stenosis or evidence of acute dissection.  There is some perfusion mismatch with this is in the area of the old infarct.  Last blood pressure recycled with a systolic of 170.  Repeat exam after CT imaging significantly improved patient now with an NIH of 2.  Residual deficits could be just related to a postictal state but ischemic CVA must still be considered on the differential.  Dr. Arora from neurology consulted and agreement with plan for Keppra and admission for further work-up.  Aspirin ordered pending dysphagia screen.  Wife updated on current plan of care.  Of note patient was noted to have a sodium of 129 and a potassium of 3.1 supplementation was ordered and repeat labs were ordered. Pt remained stable was accepted to medicine

## 2022-12-08 NOTE — ED PROVIDER NOTE - OBJECTIVE STATEMENT
Patient's is a patient is a 72-year-old male who presents to the emergency room with a chief complaint of altered mental status and seizure-like activity.  Past medical history of a CVA in 2015 no residual deficit and a history of a seizure in 2016 on Keppra, hypertension, A. fib on Eliquis.  On arrival to the emergency room patient is a weak but unable to answer any questions appropriately appears to be making nonsensical noises.  Wife reports that around 8 8:15 PM while patient was working at a computer she started hearing that he was making strange noises.  She went to ask him what was wrong but he did not really answer her then seem to get up and go to the bathroom but appeared to be confused trying to pull up his zipper.  She then reports that she noted some right-sided weakness.  She was concerned secondary to his previous history of a CVA EMS was called.  While in route to the hospital for EMS patient had a witnessed tonic-clonic seizure.  On arrival patient was altered awake maintaining an airway but not following any commands and not answering questions.  NIH was performed after patient returned from CT with significant improvement in his mental status.  Wife was questioned about patient's medications confirms he is on Eliquis but she is not clear when he last took this as he is in charge of his own medications

## 2022-12-08 NOTE — ED PROVIDER NOTE - CARE PLAN
Principal Discharge DX:	AMS (altered mental status)  Secondary Diagnosis:	Seizure  Secondary Diagnosis:	Hyponatremia   1

## 2022-12-08 NOTE — ED PROVIDER NOTE - NSICDXPASTMEDICALHX_GEN_ALL_CORE_FT
PAST MEDICAL HISTORY:  Atrial fibrillation     CVA (cerebral vascular accident)     Hypertension     Seizure

## 2022-12-08 NOTE — ED ADULT NURSE NOTE - OBJECTIVE STATEMENT
Pt BIBA from home c/o altered mental status and seizure-like activity.  Past medical history of a CVA in 2015 no residual deficit and a history of a seizure in 2016 on Keppra, hypertension, A. fib on Eliquis.  On arrival to the emergency room patient is a weak but unable to answer any questions appropriately appears to be making nonsensical noises.  Wife reports that around 8 8:15 PM while patient was working at a computer she started hearing that he was making strange noises.  She went to ask him what was wrong but he did not really answer her then seem to get up and go to the bathroom but appeared to be confused trying to pull up his zipper.  She then reports that she noted some right-sided weakness.  She was concerned secondary to his previous history of a CVA EMS was called.  While in route to the hospital for EMS patient had a witnessed tonic-clonic seizure.  On arrival patient was altered awake maintaining an airway but not following any commands and not answering questions.  NIH was performed after patient returned from CT with significant improvement in his mental status. Safety maintained, call bell within reach, nursing care ongoing.

## 2022-12-08 NOTE — ED PROVIDER NOTE - PROGRESS NOTE DETAILS
still no INR result and unable to ascertain when pt last took Eliquis so pt would not be a TPA candidate spoke with Dr. Arora in agreement with plan and Keppra

## 2022-12-08 NOTE — ED PROVIDER NOTE - CCCP TRG CHIEF CMPLNT
Discharge instructions reviewed with patient    Medication list and understanding of medications reviewed with patient. OTC and herbal medications reviewed and added to med list if applicable  Barriers to adherence assessed. Guidance given regarding new medications this visit, including reason for taking this medicine, and common side effects. see chief complaint quote

## 2022-12-09 VITALS
RESPIRATION RATE: 18 BRPM | OXYGEN SATURATION: 98 % | TEMPERATURE: 98 F | HEART RATE: 72 BPM | DIASTOLIC BLOOD PRESSURE: 70 MMHG | SYSTOLIC BLOOD PRESSURE: 140 MMHG

## 2022-12-09 DIAGNOSIS — I48.91 UNSPECIFIED ATRIAL FIBRILLATION: ICD-10-CM

## 2022-12-09 DIAGNOSIS — R56.9 UNSPECIFIED CONVULSIONS: ICD-10-CM

## 2022-12-09 DIAGNOSIS — I10 ESSENTIAL (PRIMARY) HYPERTENSION: ICD-10-CM

## 2022-12-09 DIAGNOSIS — Z79.899 OTHER LONG TERM (CURRENT) DRUG THERAPY: ICD-10-CM

## 2022-12-09 DIAGNOSIS — R41.82 ALTERED MENTAL STATUS, UNSPECIFIED: ICD-10-CM

## 2022-12-09 DIAGNOSIS — Z29.9 ENCOUNTER FOR PROPHYLACTIC MEASURES, UNSPECIFIED: ICD-10-CM

## 2022-12-09 DIAGNOSIS — R41.0 DISORIENTATION, UNSPECIFIED: ICD-10-CM

## 2022-12-09 PROBLEM — I63.9 CEREBRAL INFARCTION, UNSPECIFIED: Chronic | Status: ACTIVE | Noted: 2018-04-12

## 2022-12-09 LAB
A1C WITH ESTIMATED AVERAGE GLUCOSE RESULT: 5.2 % — SIGNIFICANT CHANGE UP (ref 4–5.6)
ANION GAP SERPL CALC-SCNC: 6 MMOL/L — SIGNIFICANT CHANGE UP (ref 5–17)
ANION GAP SERPL CALC-SCNC: 8 MMOL/L — SIGNIFICANT CHANGE UP (ref 5–17)
BUN SERPL-MCNC: 10 MG/DL — SIGNIFICANT CHANGE UP (ref 7–23)
BUN SERPL-MCNC: 12 MG/DL — SIGNIFICANT CHANGE UP (ref 7–23)
CALCIUM SERPL-MCNC: 8.4 MG/DL — LOW (ref 8.5–10.1)
CALCIUM SERPL-MCNC: 8.9 MG/DL — SIGNIFICANT CHANGE UP (ref 8.5–10.1)
CHLORIDE SERPL-SCNC: 101 MMOL/L — SIGNIFICANT CHANGE UP (ref 96–108)
CHLORIDE SERPL-SCNC: 102 MMOL/L — SIGNIFICANT CHANGE UP (ref 96–108)
CHOLEST SERPL-MCNC: 129 MG/DL — SIGNIFICANT CHANGE UP
CO2 SERPL-SCNC: 23 MMOL/L — SIGNIFICANT CHANGE UP (ref 22–31)
CO2 SERPL-SCNC: 25 MMOL/L — SIGNIFICANT CHANGE UP (ref 22–31)
CREAT SERPL-MCNC: 0.59 MG/DL — SIGNIFICANT CHANGE UP (ref 0.5–1.3)
CREAT SERPL-MCNC: 0.66 MG/DL — SIGNIFICANT CHANGE UP (ref 0.5–1.3)
EGFR: 102 ML/MIN/1.73M2 — SIGNIFICANT CHANGE UP
EGFR: 99 ML/MIN/1.73M2 — SIGNIFICANT CHANGE UP
ESTIMATED AVERAGE GLUCOSE: 103 MG/DL — SIGNIFICANT CHANGE UP (ref 68–114)
GLUCOSE SERPL-MCNC: 100 MG/DL — HIGH (ref 70–99)
GLUCOSE SERPL-MCNC: 108 MG/DL — HIGH (ref 70–99)
HCT VFR BLD CALC: 39.3 % — SIGNIFICANT CHANGE UP (ref 39–50)
HDLC SERPL-MCNC: 51 MG/DL — SIGNIFICANT CHANGE UP
HGB BLD-MCNC: 14.3 G/DL — SIGNIFICANT CHANGE UP (ref 13–17)
LIPID PNL WITH DIRECT LDL SERPL: 64 MG/DL — SIGNIFICANT CHANGE UP
MAGNESIUM SERPL-MCNC: 1.9 MG/DL — SIGNIFICANT CHANGE UP (ref 1.6–2.6)
MCHC RBC-ENTMCNC: 31.6 PG — SIGNIFICANT CHANGE UP (ref 27–34)
MCHC RBC-ENTMCNC: 36.4 GM/DL — HIGH (ref 32–36)
MCV RBC AUTO: 86.9 FL — SIGNIFICANT CHANGE UP (ref 80–100)
NON HDL CHOLESTEROL: 78 MG/DL — SIGNIFICANT CHANGE UP
NRBC # BLD: 0 /100 WBCS — SIGNIFICANT CHANGE UP (ref 0–0)
PHOSPHATE SERPL-MCNC: 2.2 MG/DL — LOW (ref 2.5–4.5)
PLATELET # BLD AUTO: 165 K/UL — SIGNIFICANT CHANGE UP (ref 150–400)
POTASSIUM SERPL-MCNC: 3.4 MMOL/L — LOW (ref 3.5–5.3)
POTASSIUM SERPL-MCNC: 3.9 MMOL/L — SIGNIFICANT CHANGE UP (ref 3.5–5.3)
POTASSIUM SERPL-SCNC: 3.4 MMOL/L — LOW (ref 3.5–5.3)
POTASSIUM SERPL-SCNC: 3.9 MMOL/L — SIGNIFICANT CHANGE UP (ref 3.5–5.3)
RBC # BLD: 4.52 M/UL — SIGNIFICANT CHANGE UP (ref 4.2–5.8)
RBC # FLD: 13.2 % — SIGNIFICANT CHANGE UP (ref 10.3–14.5)
SODIUM SERPL-SCNC: 132 MMOL/L — LOW (ref 135–145)
SODIUM SERPL-SCNC: 133 MMOL/L — LOW (ref 135–145)
TRIGL SERPL-MCNC: 71 MG/DL — SIGNIFICANT CHANGE UP
TSH SERPL-MCNC: 2.5 UIU/ML — SIGNIFICANT CHANGE UP (ref 0.36–3.74)
WBC # BLD: 8.01 K/UL — SIGNIFICANT CHANGE UP (ref 3.8–10.5)
WBC # FLD AUTO: 8.01 K/UL — SIGNIFICANT CHANGE UP (ref 3.8–10.5)

## 2022-12-09 PROCEDURE — 93005 ELECTROCARDIOGRAM TRACING: CPT

## 2022-12-09 PROCEDURE — 70551 MRI BRAIN STEM W/O DYE: CPT

## 2022-12-09 PROCEDURE — 84484 ASSAY OF TROPONIN QUANT: CPT

## 2022-12-09 PROCEDURE — 99223 1ST HOSP IP/OBS HIGH 75: CPT | Mod: GC,AI

## 2022-12-09 PROCEDURE — 85610 PROTHROMBIN TIME: CPT

## 2022-12-09 PROCEDURE — 93010 ELECTROCARDIOGRAM REPORT: CPT

## 2022-12-09 PROCEDURE — 96374 THER/PROPH/DIAG INJ IV PUSH: CPT

## 2022-12-09 PROCEDURE — 87637 SARSCOV2&INF A&B&RSV AMP PRB: CPT

## 2022-12-09 PROCEDURE — 0042T: CPT | Mod: MA

## 2022-12-09 PROCEDURE — 80048 BASIC METABOLIC PNL TOTAL CA: CPT

## 2022-12-09 PROCEDURE — 80053 COMPREHEN METABOLIC PANEL: CPT

## 2022-12-09 PROCEDURE — 80061 LIPID PANEL: CPT

## 2022-12-09 PROCEDURE — 83735 ASSAY OF MAGNESIUM: CPT

## 2022-12-09 PROCEDURE — 70498 CT ANGIOGRAPHY NECK: CPT | Mod: MA

## 2022-12-09 PROCEDURE — 85027 COMPLETE CBC AUTOMATED: CPT

## 2022-12-09 PROCEDURE — 84443 ASSAY THYROID STIM HORMONE: CPT

## 2022-12-09 PROCEDURE — 70496 CT ANGIOGRAPHY HEAD: CPT | Mod: MA

## 2022-12-09 PROCEDURE — 82962 GLUCOSE BLOOD TEST: CPT

## 2022-12-09 PROCEDURE — 70450 CT HEAD/BRAIN W/O DYE: CPT | Mod: MA

## 2022-12-09 PROCEDURE — 99291 CRITICAL CARE FIRST HOUR: CPT

## 2022-12-09 PROCEDURE — 83036 HEMOGLOBIN GLYCOSYLATED A1C: CPT

## 2022-12-09 PROCEDURE — 80307 DRUG TEST PRSMV CHEM ANLYZR: CPT

## 2022-12-09 PROCEDURE — 85730 THROMBOPLASTIN TIME PARTIAL: CPT

## 2022-12-09 PROCEDURE — 96375 TX/PRO/DX INJ NEW DRUG ADDON: CPT

## 2022-12-09 PROCEDURE — 80177 DRUG SCRN QUAN LEVETIRACETAM: CPT

## 2022-12-09 PROCEDURE — 70551 MRI BRAIN STEM W/O DYE: CPT | Mod: 26

## 2022-12-09 PROCEDURE — 71045 X-RAY EXAM CHEST 1 VIEW: CPT

## 2022-12-09 PROCEDURE — 84100 ASSAY OF PHOSPHORUS: CPT

## 2022-12-09 PROCEDURE — 81001 URINALYSIS AUTO W/SCOPE: CPT

## 2022-12-09 PROCEDURE — 36415 COLL VENOUS BLD VENIPUNCTURE: CPT

## 2022-12-09 PROCEDURE — 85025 COMPLETE CBC W/AUTO DIFF WBC: CPT

## 2022-12-09 RX ORDER — METOPROLOL TARTRATE 50 MG
1 TABLET ORAL
Qty: 0 | Refills: 0 | DISCHARGE

## 2022-12-09 RX ORDER — APIXABAN 2.5 MG/1
5 TABLET, FILM COATED ORAL
Refills: 0 | Status: DISCONTINUED | OUTPATIENT
Start: 2022-12-09 | End: 2022-12-09

## 2022-12-09 RX ORDER — LEVETIRACETAM 250 MG/1
1 TABLET, FILM COATED ORAL
Qty: 0 | Refills: 0 | DISCHARGE
Start: 2022-12-09

## 2022-12-09 RX ORDER — THIAMINE MONONITRATE (VIT B1) 100 MG
1 TABLET ORAL
Qty: 0 | Refills: 0 | DISCHARGE

## 2022-12-09 RX ORDER — CHOLECALCIFEROL (VITAMIN D3) 125 MCG
1 CAPSULE ORAL
Qty: 0 | Refills: 0 | DISCHARGE

## 2022-12-09 RX ORDER — METOPROLOL TARTRATE 50 MG
100 TABLET ORAL DAILY
Refills: 0 | Status: DISCONTINUED | OUTPATIENT
Start: 2022-12-09 | End: 2022-12-09

## 2022-12-09 RX ORDER — ASPIRIN/CALCIUM CARB/MAGNESIUM 324 MG
1 TABLET ORAL
Qty: 0 | Refills: 0 | DISCHARGE

## 2022-12-09 RX ORDER — AMLODIPINE BESYLATE 2.5 MG/1
1 TABLET ORAL
Qty: 0 | Refills: 0 | DISCHARGE

## 2022-12-09 RX ORDER — LEVETIRACETAM 250 MG/1
1000 TABLET, FILM COATED ORAL
Refills: 0 | Status: DISCONTINUED | OUTPATIENT
Start: 2022-12-09 | End: 2022-12-09

## 2022-12-09 RX ORDER — POTASSIUM CHLORIDE 20 MEQ
40 PACKET (EA) ORAL ONCE
Refills: 0 | Status: COMPLETED | OUTPATIENT
Start: 2022-12-09 | End: 2022-12-09

## 2022-12-09 RX ORDER — HYDRALAZINE HCL 50 MG
50 TABLET ORAL
Refills: 0 | Status: DISCONTINUED | OUTPATIENT
Start: 2022-12-09 | End: 2022-12-09

## 2022-12-09 RX ORDER — FERROUS SULFATE 325(65) MG
1 TABLET ORAL
Qty: 0 | Refills: 0 | DISCHARGE

## 2022-12-09 RX ORDER — LOSARTAN POTASSIUM 100 MG/1
1 TABLET, FILM COATED ORAL
Qty: 0 | Refills: 0 | DISCHARGE

## 2022-12-09 RX ORDER — POTASSIUM PHOSPHATE, MONOBASIC POTASSIUM PHOSPHATE, DIBASIC 236; 224 MG/ML; MG/ML
15 INJECTION, SOLUTION INTRAVENOUS ONCE
Refills: 0 | Status: COMPLETED | OUTPATIENT
Start: 2022-12-09 | End: 2022-12-09

## 2022-12-09 RX ORDER — FOLIC ACID 0.8 MG
1 TABLET ORAL
Qty: 0 | Refills: 0 | DISCHARGE

## 2022-12-09 RX ORDER — ATORVASTATIN CALCIUM 80 MG/1
80 TABLET, FILM COATED ORAL AT BEDTIME
Refills: 0 | Status: DISCONTINUED | OUTPATIENT
Start: 2022-12-09 | End: 2022-12-09

## 2022-12-09 RX ORDER — HYDROCHLOROTHIAZIDE 25 MG
1 TABLET ORAL
Qty: 0 | Refills: 0 | DISCHARGE

## 2022-12-09 RX ORDER — APIXABAN 2.5 MG/1
1 TABLET, FILM COATED ORAL
Qty: 0 | Refills: 0 | DISCHARGE

## 2022-12-09 RX ORDER — HYDRALAZINE HCL 50 MG
1 TABLET ORAL
Qty: 0 | Refills: 0 | DISCHARGE

## 2022-12-09 RX ORDER — SODIUM,POTASSIUM PHOSPHATES 278-250MG
1 POWDER IN PACKET (EA) ORAL ONCE
Refills: 0 | Status: COMPLETED | OUTPATIENT
Start: 2022-12-09 | End: 2022-12-09

## 2022-12-09 RX ADMIN — APIXABAN 5 MILLIGRAM(S): 2.5 TABLET, FILM COATED ORAL at 17:05

## 2022-12-09 RX ADMIN — LEVETIRACETAM 1000 MILLIGRAM(S): 250 TABLET, FILM COATED ORAL at 06:27

## 2022-12-09 RX ADMIN — Medication 1 TABLET(S): at 11:49

## 2022-12-09 RX ADMIN — Medication 100 MILLIGRAM(S): at 06:27

## 2022-12-09 RX ADMIN — Medication 50 MILLIGRAM(S): at 17:04

## 2022-12-09 RX ADMIN — Medication 50 MILLIGRAM(S): at 06:27

## 2022-12-09 RX ADMIN — APIXABAN 5 MILLIGRAM(S): 2.5 TABLET, FILM COATED ORAL at 06:27

## 2022-12-09 RX ADMIN — Medication 1 PACKET(S): at 04:10

## 2022-12-09 RX ADMIN — POTASSIUM PHOSPHATE, MONOBASIC POTASSIUM PHOSPHATE, DIBASIC 83.33 MILLIMOLE(S): 236; 224 INJECTION, SOLUTION INTRAVENOUS at 16:26

## 2022-12-09 RX ADMIN — Medication 100 MILLIEQUIVALENT(S): at 04:05

## 2022-12-09 RX ADMIN — Medication 40 MILLIEQUIVALENT(S): at 16:57

## 2022-12-09 RX ADMIN — Medication 100 MILLIEQUIVALENT(S): at 02:17

## 2022-12-09 RX ADMIN — Medication 100 MILLIEQUIVALENT(S): at 04:49

## 2022-12-09 RX ADMIN — LEVETIRACETAM 1000 MILLIGRAM(S): 250 TABLET, FILM COATED ORAL at 17:09

## 2022-12-09 NOTE — H&P ADULT - PROBLEM SELECTOR PLAN 4
Chronic  - /141 on presentation to ED, improved to 164/78  - Uncertain which antihypertensives patient takes at home as neither patient nor wife have list and pharmacy is closed  - Med rec to be completed in am by day team Chronic  - /141 on presentation to ED, improved to 164/78  - Uncertain which antihypertensives patient takes at home as neither patient nor wife have list and pharmacy is closed  - Will continue with hydralazine and toprol XL  - Med rec to be completed in am by day team

## 2022-12-09 NOTE — H&P ADULT - PROBLEM SELECTOR PLAN 1
Pt with confusion, aphasia. Witnessed tonic-clonic seizure in ambulance. Patient now back at baseline  - Na 129, K 3.1, Phos 2.0  - s/p 1L NS Bolus x1, Keppra 1000mg IV x1, KCl 10meq x3, phos-nak 1pkt in ED  - Admit to telemetry   - Continue Keppra 1000mg BID  - Seizure precautions  - Monitor electrolytes and replete as needed  - Neuro (Dr. Arora) consulted, f/u recs Pt with confusion, aphasia. Witnessed tonic-clonic seizure in ambulance. Patient now back at baseline  - Na 129, K 3.1, Phos 2.0  - s/p 1L NS Bolus x1, Keppra 1000mg IV x1, KCl 10meq x3, phos-nak 1pkt in ED  - Admit to telemetry   - Continue Keppra 1000mg BID - check keppra level  - Seizure precautions  - MR brain seizure protocol  - Monitor electrolytes and replete as needed  - Neuro (Dr. Arora) consulted, f/u recs

## 2022-12-09 NOTE — H&P ADULT - HISTORY OF PRESENT ILLNESS
72 year old male with PMHx of atrial fibrillation (on eliquis), ischemic CVA (2015), Seizure disorder (diagnosed 2016, attributed to prior CVA - on keppra), HTN presented to the ED with altered mental status and seizure like activity. Patient's mental status is improved from when he arrived to the ED and he states that he feels at baseline, although he is unsure of the events that transpired. He states that he felt "weird and strange" and was unable to get his words out. Does not remember the subsequent events but thinks he must have passed out. Collateral obtained from wife.  72 year old male with PMHx of atrial fibrillation (on eliquis), ischemic CVA (2015), Seizure disorder (diagnosed 2016, attributed to prior CVA - on keppra), HTN presented to the ED with altered mental status and seizure like activity. Patient's mental status is improved from when he arrived to the ED and he states that he feels at baseline, although he is unsure of the events that transpired. He states that he felt "weird and strange" and was unable to get his words out. Does not remember the subsequent events but thinks he must have passed out. Attempt to call wife overnight to obtain collateral unsuccessful.    ED Course:  Vital Signs: T: **, HR: 111, BP: 213/141, spo2: 97% on RA  Significant Labs: PT: 16.0, INR: 1.36, Na: 129, K: 3.1, HCO3: 20, Glucose: 116, Ca: 8.3, Phos: 2.0  ECG:  CXR: No acute lung pathology on personal read, pending official read  CT Brain Stroke Protocol: CTA Neck: No significant flow-limiting stenosis or evidence of acute   dissection within the cervical carotid or vertebral arteries.    CTA Head: No proximal large vessel occlusion.    CT Perfusion: Perfusion imaging predicts no core infarct. Predicted   volume of 85 mL penumbra of tissue at risk, localized to the posterior   fossa, left anterior temporal pole, and bilateral occipital lobes,   greater on the left, a large portion of which corresponds to an area of   chronic infarct within the left parieto-occipital lobe.    Received 1L NS Bolus x1, Keppra 1000mg IV x1, KCl 10meq x3 in ED 72 year old male with PMHx of atrial fibrillation (on eliquis), ischemic CVA (2015), Seizure disorder (diagnosed 2016, attributed to prior CVA - on keppra), HTN presented to the ED with altered mental status and seizure like activity. Patient's mental status is improved from when he arrived to the ED and he states that he feels at baseline, although he is unsure of the events that transpired. He states that he felt "weird and strange" and was unable to get his words out. Does not remember the subsequent events but thinks he must have passed out.     Wife at bedside. She reports round 8 PM while patient was working at a computer she started hearing that he was making strange noises.  She went to ask him what was wrong but he did not really answer her then seemed to get up and go to the bathroom but appeared to get his zipper stuck and urinated on himself.  She then reports that he tried to  a glass but his right upper extremity seemed weak.  She was concerned secondary to his previous history of a CVA and EMS was called.  While in route to the hospital for EMS patient had a witnessed tonic-clonic seizure.  On arrival patient was altered awake maintaining an airway but not following any commands and not answering questions.  NIH was performed after patient returned from CT with significant improvement in his mental status.    Upon my interaction with patient, patient has significant improvement in symptoms. Per patient and wife, he is back to his baseline.    ED Course:  Vital Signs: T: **, HR: 111, BP: 213/141, spo2: 97% on RA  Significant Labs: PT: 16.0, INR: 1.36, Na: 129, K: 3.1, HCO3: 20, Glucose: 116, Ca: 8.3, Phos: 2.0  ECG: afib @92bpm, incomplete RBBB, prolonged QT on personal read, pending official read  CXR: No acute lung pathology on personal read, pending official read  CT Brain Stroke Protocol: CTA Neck: No significant flow-limiting stenosis or evidence of acute   dissection within the cervical carotid or vertebral arteries.    CTA Head: No proximal large vessel occlusion.    CT Perfusion: Perfusion imaging predicts no core infarct. Predicted   volume of 85 mL penumbra of tissue at risk, localized to the posterior   fossa, left anterior temporal pole, and bilateral occipital lobes,   greater on the left, a large portion of which corresponds to an area of   chronic infarct within the left parieto-occipital lobe.    Received 1L NS Bolus x1, Keppra 1000mg IV x1, KCl 10meq x3 in ED 72 year old male with PMHx of atrial fibrillation (on eliquis), ischemic and hemorrhagic CVA (2015), Seizure disorder (diagnosed 2016, attributed to prior CVA - on keppra), Hx of EtOH abuse (patient states quit 10 years ago, per chart review appears to have quit ~5years ago), HTN presented to the ED with altered mental status and seizure like activity. Patient's mental status is improved from when he arrived to the ED and he states that he feels at baseline, although he is unsure of the events that transpired. He states that he felt "weird and strange" and was unable to get his words out. Does not remember the subsequent events but thinks he must have passed out. Unsure of his medications aside from aspirin, keppra and eliquis. States that he switched to [facility] from [facility] when he was placed on eliquis due to cost.    Collateral obtained from wife subsequently at bedside. She reports around 8 PM while patient was working at a computer she started hearing that he was making strange noises.  She went to ask him what was wrong but he did not really answer her then seemed to get up and go to the bathroom but appeared to get his zipper stuck and urinated on himself.  She then reports that he tried to  a glass but his right upper extremity seemed weak.  She was concerned secondary to his previous history of a CVA and EMS was called.  While in route to the hospital for EMS patient had a witnessed tonic-clonic seizure.  On arrival patient was altered awake maintaining an airway but not following any commands and not answering questions.  NIH was performed after patient returned from CT with significant improvement in his mental status.    Upon my interaction with patient, patient has significant improvement in symptoms. Per patient and wife, he is back to his baseline.    ED Course:  Vital Signs: T: **, HR: 111, BP: 213/141, spo2: 97% on RA  Significant Labs: PT: 16.0, INR: 1.36, Na: 129, K: 3.1, HCO3: 20, Glucose: 116, Ca: 8.3, Phos: 2.0  ECG: afib @92bpm, incomplete RBBB, prolonged QT on personal read, pending official read  CXR: No acute lung pathology on personal read, pending official read  CT Brain Stroke Protocol: No acute intracranial hemorrhage, mass effect, or evidence of acute vascular territorial infarction. If clinical symptoms persist or worsen, more sensitive evaluation with brain MRI may be obtained, if no contraindications exist.  CTA Neck: No significant flow-limiting stenosis or evidence of acute dissection within the cervical carotid or vertebral arteries.  CTA Head: No proximal large vessel occlusion.  CT Perfusion: Perfusion imaging predicts no core infarct. Predicted volume of 85 mL penumbra of tissue at risk, localized to the posterior fossa, left anterior temporal pole, and bilateral occipital lobes, greater on the left, a large portion of which corresponds to an area of chronic infarct within the left parieto-occipital lobe.    Received 1L NS Bolus x1, Keppra 1000mg IV x1, KCl 10meq x3 in ED

## 2022-12-09 NOTE — H&P ADULT - PROBLEM SELECTOR PLAN 2
Presented with aphasia, confusion. Likely 2/2 post-ictal state. Now resolved. R/o CVA  - Patient had prior aphasic seizure in 2016  - CT Brain Stroke Protocol: CTA Neck: No significant flow-limiting stenosis or evidence of acute dissection within the cervical carotid or vertebral arteries. CTA Head: No proximal large vessel occlusion. CT Perfusion: Perfusion imaging predicts no core infarct. Predicted volume of 85 mL penumbra of tissue at risk, localized to the posterior fossa, left anterior temporal pole, and bilateral occipital lobes, greater on the left, a large portion of which corresponds to an area of chronic infarct within the left parieto-occipital lobe.  - Follow up TTE, MRA/MRI head/neck  - Continue home ASA, Eliquis  - Aspiration, seizure, fall precaution  - Unclear if pt takes a statin - will start atorvastatin 80mg pending completed med rec  - Neuro checks Q4  - Passed bedside dysphagia screen, will resume diet  - PT and OT consultation  - Check A1c, lipid profile, TSH for further risk stratification  - Neuro (Dr. Arora) consulted, f/u recs Presented with aphasia, confusion. Likely 2/2 post-ictal state. Now resolved. R/o CVA  - Patient had prior aphasic seizure in 2016  - CT Brain Stroke Protocol: CTA Neck: No significant flow-limiting stenosis or evidence of acute dissection within the cervical carotid or vertebral arteries. CTA Head: No proximal large vessel occlusion. CT Perfusion: Perfusion imaging predicts no core infarct. Predicted volume of 85 mL penumbra of tissue at risk, localized to the posterior fossa, left anterior temporal pole, and bilateral occipital lobes, greater on the left, a large portion of which corresponds to an area of chronic infarct within the left parieto-occipital lobe.  - Follow up TTE, MRI head  - Continue home ASA, Eliquis  - Aspiration, seizure, fall precaution  - Unclear if pt takes a statin - will start atorvastatin 80mg pending completed med rec  - Neuro checks Q4  - Passed bedside dysphagia screen, will resume diet  - PT and OT consultation  - Check A1c, lipid profile, TSH for further risk stratification  - Neuro (Dr. Arora) consulted, f/u recs

## 2022-12-09 NOTE — H&P ADULT - PROBLEM SELECTOR PLAN 6
Incomplete med rec as neither patient nor wife have list and pharmacy is closed  - Med rec to be completed in am by day team Yes

## 2022-12-09 NOTE — H&P ADULT - ATTENDING COMMENTS
72 year old male with PMHx of atrial fibrillation (on eliquis), ischemic and hemorrhagic CVA (2015), Seizure disorder (diagnosed 2016, attributed to prior CVA - on keppra), Hx of EtOH abuse (patient states quit 10 years ago, per chart review appears to have quit ~5years ago), HTN admitted with seizure, encephalopathy, rule out CVA. Admit to telemetry. Back to baseline. Most consistent with seizure but cannot rule out CVA as inciting event. CT imaging reviewed - perfusion study with defects c/w chronic infarct. No focal deficits on exam. Does not appear to be acute CVA but will pursue work up. Neurochecks q4. A1C, lipid panel in AM. Check keppra level. Continue home AED regimen. Check MR brain seizure protocol. Check 2D ECHO. Seizure precautions. Neurology consulted Dr. Arora. Discussed with patient at bedside.    Agree with H&P as outlined above, edited where appropriate.

## 2022-12-09 NOTE — H&P ADULT - NSICDXFAMILYHX_GEN_ALL_CORE_FT
FAMILY HISTORY:  Father  Still living? Unknown  FHx: smoking, Age at diagnosis: Age Unknown    Mother  Still living? Unknown  FHx: smoking, Age at diagnosis: Age Unknown

## 2022-12-09 NOTE — ED ADULT NURSE REASSESSMENT NOTE - NS ED NURSE REASSESS COMMENT FT1
pt remains in er admitted to hospital is alert oriented speech clear denies pain no seizure activity noted has equal bilateral strength able to answer all questions for mri today able to complete questionnaire form spoke with Dr Phelps pt can go off monitor for tests

## 2022-12-09 NOTE — H&P ADULT - NSHPSOCIALHISTORY_GEN_ALL_CORE
Lives at home with wife. Former smoker (1ppd x 20 years, quit 30 years ago). Denies current EtOH use (states that he was a fairly heavy drinker until 10 years ago). Denies illicit drug use. Ambulates independently. Retired, former respiratory therapist for 42 years.

## 2022-12-09 NOTE — H&P ADULT - ASSESSMENT
72 year old male with PMHx of atrial fibrillation (on eliquis), ischemic CVA (2015), Seizure disorder (diagnosed 2016, attributed to prior CVA - on keppra), HTN admitted with seizure, encephalopathy, rule out CVA. 72 year old male with PMHx of atrial fibrillation (on eliquis), ischemic CVA (2015), Seizure disorder (diagnosed 2016, attributed to prior CVA - on keppra), HTN admitted with seizure, encephalopathy, rule out CVA.    ED Course:  Vital Signs: T: **, HR: 111, BP: 213/141, spo2: 97% on RA  Significant Labs: PT: 16.0, INR: 1.36, Na: 129, K: 3.1, HCO3: 20, Glucose: 116, Ca: 8.3, Phos: 2.0  ECG:  CXR: No acute lung pathology on personal read, pending official read  CT Brain Stroke Protocol: CTA Neck: No significant flow-limiting stenosis or evidence of acute   dissection within the cervical carotid or vertebral arteries.    CTA Head: No proximal large vessel occlusion.    CT Perfusion: Perfusion imaging predicts no core infarct. Predicted   volume of 85 mL penumbra of tissue at risk, localized to the posterior   fossa, left anterior temporal pole, and bilateral occipital lobes,   greater on the left, a large portion of which corresponds to an area of   chronic infarct within the left parieto-occipital lobe. 72 year old male with PMHx of atrial fibrillation (on eliquis), ischemic and hemorrhagic CVA (2015), Seizure disorder (diagnosed 2016, attributed to prior CVA - on keppra), Hx of EtOH abuse (patient states quit 10 years ago, per chart review appears to have quit ~5years ago), HTN admitted with seizure, encephalopathy, rule out CVA.

## 2022-12-09 NOTE — DISCHARGE NOTE PROVIDER - NSTOBACCOUSAGEY/N_GEN_A_CS
Nursing Note by Eduardo Rodriguez MA at 03/08/17 11:08 AM     Author:  Eduardo Rodriguez MA Service:  (none) Author Type:  Medical Assistant     Filed:  03/08/17 11:08 AM Encounter Date:  3/8/2017 Status:  Signed     :  Eduadro Rodriguez MA (Medical Assistant)            Patient can be reached at[DA1.1T] [phone][[email or web address]]. May leave a message.[[email or web address]]        Revision History        User Key Date/Time User Provider Type Action    > DA1.1 03/08/17 11:08 AM Eduardo Rodriguez MA Medical Assistant Sign    M - Manual, T - Template             No

## 2022-12-09 NOTE — H&P ADULT - PROBLEM SELECTOR PLAN 3
Chronic  - Continue Metoprolol succinate 100mg qd, Eliquis 5mg BID  - Monitor on telemetry  - ECG: afib @92bpm, incomplete RBBB, prolonged QT on personal read, pending official read

## 2022-12-09 NOTE — DISCHARGE NOTE PROVIDER - NSDCMRMEDTOKEN_GEN_ALL_CORE_FT
aspirin 81 mg oral tablet: 1 tab(s) orally once a day  Eliquis 5 mg oral tablet: 1 tab(s) orally 2 times a day  Feosol 200 mg (65 mg elemental iron) oral tablet: 1 tab(s) orally once a day  folic acid 0.4 mg oral tablet: 1 tab(s) orally once a day  hydrALAZINE 50 mg oral tablet: 1 tab(s) orally 2 times a day  hydroCHLOROthiazide 25 mg oral tablet: 1 tab(s) orally once a day  levETIRAcetam 1000 mg oral tablet: 1 tab(s) orally 2 times a day  losartan 100 mg oral tablet: 1 tab(s) orally once a day  Metoprolol Succinate  mg oral tablet, extended release: 1 tab(s) orally once a day  Multiple Vitamins oral tablet: 1 tab(s) orally once a day  Vitamin B1 100 mg oral tablet: 1 tab(s) orally once a day  Vitamin D3 25 mcg (1000 intl units) oral tablet: 1 tab(s) orally once a day

## 2022-12-09 NOTE — PROGRESS NOTE ADULT - SUBJECTIVE AND OBJECTIVE BOX
neuro cons dict  seen for recurrence of seizure)(partial with secondary generalization)  post ictal aphasia/ams doubt new cva  hx of seizure on keppra 1000 mg bid  hx of cva  brain mri- no acute cva  increase keppra to 1250 mg bid  neuro wise stable for dc  management dr hill , patient and family  neuro follow up as out patient.

## 2022-12-09 NOTE — DISCHARGE NOTE PROVIDER - NSDCCPCAREPLAN_GEN_ALL_CORE_FT
PRINCIPAL DISCHARGE DIAGNOSIS  Diagnosis: Seizure  Assessment and Plan of Treatment: You had a seziure. Stroke was ruled out. Please increase your Keppra dosing to 1000mg twice daily. Adherence to your regimen will be important in order to prevent further episodes of seizure activity.      SECONDARY DISCHARGE DIAGNOSES  Diagnosis: Hyponatremia  Assessment and Plan of Treatment: You had mildly low sodium levels. They have improved slightly. Please follow up with your PCP within one week to repeat your labs. Your level is 133 at time of discharge.    Diagnosis: Atrial fibrillation  Assessment and Plan of Treatment: Resume your home medications.    Diagnosis: Hypertension  Assessment and Plan of Treatment: Resume your home medications.    Diagnosis: History of CVA (cerebrovascular accident)  Assessment and Plan of Treatment: Continue blood pressure control, continue aspirin, continue anticoagulation, discuss starting a statin medication with your primary care physician.

## 2022-12-09 NOTE — DISCHARGE NOTE NURSING/CASE MANAGEMENT/SOCIAL WORK - NSDCPEFALRISK_GEN_ALL_CORE
For information on Fall & Injury Prevention, visit: [email or web address] OR  [email or web address] OR  https://www.cdc.gov/steadi/patient.html

## 2022-12-09 NOTE — H&P ADULT - NSHPPHYSICALEXAM_GEN_ALL_CORE
T(C): 36.8 (12-08-22 @ 23:00), Max: 36.9 (12-08-22 @ 21:14)  HR: 85 (12-08-22 @ 23:00) (85 - 111)  BP: 169/85 (12-08-22 @ 23:00) (169/85 - 213/141)  RR: 20 (12-08-22 @ 23:00) (20 - 20)  SpO2: 98% (12-08-22 @ 23:00) (97% - 98%)    General: No apparent distress  Head: normocephalic, atraumatic  Eyes: EOMI, anicteric  ENT: moist mucous membranes, no pharyngeal exudates  Heart: irregular, S1, S2, no murmurs  Chest: CTA b/l, no rales, rhonchi, or wheezes  Abd: BS+, soft, NT, ND  Back: no CVA tenderness  Extr: no edema or cyanosis  Neuro: AA&Ox2, no focal weakness, sensation to light touch intact  Psych: normal affect T(C): 36.8 (12-08-22 @ 23:00), Max: 36.9 (12-08-22 @ 21:14)  HR: 85 (12-08-22 @ 23:00) (85 - 111)  BP: 169/85 (12-08-22 @ 23:00) (169/85 - 213/141)  RR: 20 (12-08-22 @ 23:00) (20 - 20)  SpO2: 98% (12-08-22 @ 23:00) (97% - 98%)    General: No apparent distress  Head: normocephalic, atraumatic  Eyes: EOMI, anicteric  ENT: moist mucous membranes, no pharyngeal exudates  Heart: irregular, S1, S2, no murmurs  Chest: CTA b/l, no rales, rhonchi, or wheezes  Abd: BS+, soft, NT, ND  Back: no CVA tenderness  Extr: no edema or cyanosis  Neuro: AA&Ox3, no focal weakness, sensation to light touch intact. PERRLA, EOMI, strength 5/5 in upper and lower extremities bilaterally, mild tremor of b/l upper extremities  Psych: normal affect

## 2022-12-09 NOTE — DISCHARGE NOTE NURSING/CASE MANAGEMENT/SOCIAL WORK - PATIENT PORTAL LINK FT
You can access the FollowMyHealth Patient Portal offered by [facility] by registering at the following website: [email or web address]. By joining [employer]’s FollowMyHealth portal, you will also be able to view your health information using other applications (apps) compatible with our system.

## 2022-12-12 LAB — LEVETIRACETAM SERPL-MCNC: 15.5 UG/ML — SIGNIFICANT CHANGE UP (ref 10–40)

## 2022-12-20 ENCOUNTER — OFFICE (OUTPATIENT)
Dept: URBAN - METROPOLITAN AREA CLINIC 94 | Facility: CLINIC | Age: 72
Setting detail: OPHTHALMOLOGY
End: 2022-12-20
Payer: MEDICARE

## 2022-12-20 VITALS — HEIGHT: 60 IN

## 2022-12-20 DIAGNOSIS — H35.371: ICD-10-CM

## 2022-12-20 DIAGNOSIS — H33.8: ICD-10-CM

## 2022-12-20 DIAGNOSIS — H33.312: ICD-10-CM

## 2022-12-20 DIAGNOSIS — H35.033: ICD-10-CM

## 2022-12-20 DIAGNOSIS — H43.812: ICD-10-CM

## 2022-12-20 PROBLEM — H16.222 DRY EYE SYNDROME K SICCA; RIGHT EYE, LEFT EYE, BOTH EYES: Status: ACTIVE | Noted: 2022-12-20

## 2022-12-20 PROBLEM — H16.223 DRY EYE SYNDROME K SICCA; RIGHT EYE, LEFT EYE, BOTH EYES: Status: ACTIVE | Noted: 2022-12-20

## 2022-12-20 PROBLEM — H16.221 DRY EYE SYNDROME K SICCA; RIGHT EYE, LEFT EYE, BOTH EYES: Status: ACTIVE | Noted: 2022-12-20

## 2022-12-20 PROCEDURE — 92014 COMPRE OPH EXAM EST PT 1/>: CPT | Performed by: OPHTHALMOLOGY

## 2022-12-20 PROCEDURE — 92134 CPTRZ OPH DX IMG PST SGM RTA: CPT | Performed by: OPHTHALMOLOGY

## 2022-12-20 ASSESSMENT — SUPERFICIAL PUNCTATE KERATITIS (SPK)
OD_SPK: T
OS_SPK: T

## 2022-12-20 ASSESSMENT — REFRACTION_AUTOREFRACTION
OD_SPHERE: +0.50
OD_AXIS: 113
OS_CYLINDER: -0.25
OS_AXIS: 085
OD_CYLINDER: -1.00
OS_SPHERE: -1.75

## 2022-12-20 ASSESSMENT — SPHEQUIV_DERIVED
OS_SPHEQUIV: -1.875
OD_SPHEQUIV: 0

## 2022-12-20 ASSESSMENT — VISUAL ACUITY
OD_BCVA: 20/80
OS_BCVA: 20/30-

## 2022-12-20 ASSESSMENT — AXIALLENGTH_DERIVED
OD_AL: 24.2527
OS_AL: 25.154

## 2022-12-20 ASSESSMENT — TONOMETRY
OD_IOP_MMHG: 18
OS_IOP_MMHG: 18

## 2022-12-20 ASSESSMENT — KERATOMETRY
OS_K2POWER_DIOPTERS: 41.50
METHOD_AUTO_MANUAL: AUTO
OS_AXISANGLE_DEGREES: 090
OD_K2POWER_DIOPTERS: 42.00
OD_AXISANGLE_DEGREES: 030
OD_K1POWER_DIOPTERS: 41.50
OS_K1POWER_DIOPTERS: 41.50

## 2022-12-20 ASSESSMENT — CONFRONTATIONAL VISUAL FIELD TEST (CVF)
OD_FINDINGS: FULL
OS_FINDINGS: FULL

## 2024-02-09 NOTE — ED ADULT TRIAGE NOTE - NS ED NURSE AMBULANCES
well developed, well nourished , in no acute distress
[facility]
